# Patient Record
Sex: FEMALE | Race: OTHER | NOT HISPANIC OR LATINO | Employment: FULL TIME | URBAN - METROPOLITAN AREA
[De-identification: names, ages, dates, MRNs, and addresses within clinical notes are randomized per-mention and may not be internally consistent; named-entity substitution may affect disease eponyms.]

---

## 2017-06-08 ENCOUNTER — HOSPITAL ENCOUNTER (OUTPATIENT)
Dept: LAB | Facility: MEDICAL CENTER | Age: 22
End: 2017-06-08
Attending: OBSTETRICS & GYNECOLOGY
Payer: COMMERCIAL

## 2017-06-26 LAB — UNCODED TEST RESULT 95040: NORMAL

## 2017-08-04 ENCOUNTER — HOSPITAL ENCOUNTER (OUTPATIENT)
Dept: LAB | Facility: MEDICAL CENTER | Age: 22
End: 2017-08-04
Attending: OBSTETRICS & GYNECOLOGY
Payer: COMMERCIAL

## 2017-08-20 ENCOUNTER — HOSPITAL ENCOUNTER (OUTPATIENT)
Facility: MEDICAL CENTER | Age: 22
End: 2017-08-20
Attending: OBSTETRICS & GYNECOLOGY | Admitting: OBSTETRICS & GYNECOLOGY
Payer: COMMERCIAL

## 2017-08-20 VITALS
WEIGHT: 154 LBS | HEIGHT: 63 IN | SYSTOLIC BLOOD PRESSURE: 120 MMHG | TEMPERATURE: 97.6 F | BODY MASS INDEX: 27.29 KG/M2 | HEART RATE: 85 BPM | DIASTOLIC BLOOD PRESSURE: 69 MMHG

## 2017-08-20 PROCEDURE — 59025 FETAL NON-STRESS TEST: CPT | Performed by: OBSTETRICS & GYNECOLOGY

## 2017-08-20 NOTE — PROGRESS NOTES
20yo  EDC , 39.3 presents with c/o decreased fm and UCs. Pt states that she hadn't felt her baby move since yesterday. Pt has been having UCs all night long. Today, her UCs have been 8 min apart. Denies LOF or vag bleeding. She has had increased mucus discharge. EFM and toco placed. FHTs found right away. Pt and family relieved. VSS. SVE 3/90/-1  1405 Dr. Jhaveri updated. OKay to discharge to home.   1410  Discharge instructions given, pt states understanding. Reactive strip obtained. Pt discharged to home  in stable condition, ambulatory, with her mother.

## 2017-08-21 ENCOUNTER — HOSPITAL ENCOUNTER (INPATIENT)
Facility: MEDICAL CENTER | Age: 22
LOS: 2 days | End: 2017-08-23
Attending: OBSTETRICS & GYNECOLOGY | Admitting: OBSTETRICS & GYNECOLOGY
Payer: COMMERCIAL

## 2017-08-21 LAB
BASOPHILS # BLD AUTO: 0.4 % (ref 0–1.8)
BASOPHILS # BLD: 0.06 K/UL (ref 0–0.12)
EOSINOPHIL # BLD AUTO: 0.04 K/UL (ref 0–0.51)
EOSINOPHIL NFR BLD: 0.3 % (ref 0–6.9)
ERYTHROCYTE [DISTWIDTH] IN BLOOD BY AUTOMATED COUNT: 43.1 FL (ref 35.9–50)
HCT VFR BLD AUTO: 44.6 % (ref 37–47)
HGB BLD-MCNC: 14.5 G/DL (ref 12–16)
HOLDING TUBE BB 8507: NORMAL
IMM GRANULOCYTES # BLD AUTO: 0.04 K/UL (ref 0–0.11)
IMM GRANULOCYTES NFR BLD AUTO: 0.3 % (ref 0–0.9)
LYMPHOCYTES # BLD AUTO: 2.18 K/UL (ref 1–4.8)
LYMPHOCYTES NFR BLD: 16.3 % (ref 22–41)
MCH RBC QN AUTO: 30.6 PG (ref 27–33)
MCHC RBC AUTO-ENTMCNC: 32.5 G/DL (ref 33.6–35)
MCV RBC AUTO: 94.1 FL (ref 81.4–97.8)
MONOCYTES # BLD AUTO: 0.6 K/UL (ref 0–0.85)
MONOCYTES NFR BLD AUTO: 4.5 % (ref 0–13.4)
NEUTROPHILS # BLD AUTO: 10.49 K/UL (ref 2–7.15)
NEUTROPHILS NFR BLD: 78.2 % (ref 44–72)
NRBC # BLD AUTO: 0 K/UL
NRBC BLD AUTO-RTO: 0 /100 WBC
PLATELET # BLD AUTO: 152 K/UL (ref 164–446)
PMV BLD AUTO: 12.6 FL (ref 9–12.9)
RBC # BLD AUTO: 4.74 M/UL (ref 4.2–5.4)
WBC # BLD AUTO: 13.4 K/UL (ref 4.8–10.8)

## 2017-08-21 PROCEDURE — 700101 HCHG RX REV CODE 250

## 2017-08-21 PROCEDURE — 700111 HCHG RX REV CODE 636 W/ 250 OVERRIDE (IP): Performed by: OBSTETRICS & GYNECOLOGY

## 2017-08-21 PROCEDURE — 10907ZC DRAINAGE OF AMNIOTIC FLUID, THERAPEUTIC FROM PRODUCTS OF CONCEPTION, VIA NATURAL OR ARTIFICIAL OPENING: ICD-10-PCS | Performed by: OBSTETRICS & GYNECOLOGY

## 2017-08-21 PROCEDURE — 700111 HCHG RX REV CODE 636 W/ 250 OVERRIDE (IP)

## 2017-08-21 PROCEDURE — 59025 FETAL NON-STRESS TEST: CPT | Performed by: OBSTETRICS & GYNECOLOGY

## 2017-08-21 PROCEDURE — 36415 COLL VENOUS BLD VENIPUNCTURE: CPT

## 2017-08-21 PROCEDURE — 303615 HCHG EPIDURAL/SPINAL ANESTHESIA FOR LABOR

## 2017-08-21 PROCEDURE — 770002 HCHG ROOM/CARE - OB PRIVATE (112)

## 2017-08-21 PROCEDURE — 304965 HCHG RECOVERY SERVICES

## 2017-08-21 PROCEDURE — 4A1HX4Z MONITORING OF PRODUCTS OF CONCEPTION, CARDIAC ELECTRICAL ACTIVITY, EXTERNAL APPROACH: ICD-10-PCS | Performed by: OBSTETRICS & GYNECOLOGY

## 2017-08-21 PROCEDURE — 700105 HCHG RX REV CODE 258: Performed by: OBSTETRICS & GYNECOLOGY

## 2017-08-21 PROCEDURE — 85025 COMPLETE CBC W/AUTO DIFF WBC: CPT

## 2017-08-21 PROCEDURE — 59409 OBSTETRICAL CARE: CPT

## 2017-08-21 PROCEDURE — 3E0234Z INTRODUCTION OF SERUM, TOXOID AND VACCINE INTO MUSCLE, PERCUTANEOUS APPROACH: ICD-10-PCS | Performed by: OBSTETRICS & GYNECOLOGY

## 2017-08-21 PROCEDURE — 0UQMXZZ REPAIR VULVA, EXTERNAL APPROACH: ICD-10-PCS | Performed by: OBSTETRICS & GYNECOLOGY

## 2017-08-21 RX ORDER — OXYTOCIN 10 [USP'U]/ML
10 INJECTION, SOLUTION INTRAMUSCULAR; INTRAVENOUS
Status: DISCONTINUED | OUTPATIENT
Start: 2017-08-21 | End: 2017-08-22 | Stop reason: HOSPADM

## 2017-08-21 RX ORDER — PENICILLIN G POTASSIUM 5000000 [IU]/1
5 INJECTION, POWDER, FOR SOLUTION INTRAMUSCULAR; INTRAVENOUS ONCE
Status: COMPLETED | OUTPATIENT
Start: 2017-08-21 | End: 2017-08-21

## 2017-08-21 RX ORDER — ROPIVACAINE HYDROCHLORIDE 2 MG/ML
INJECTION, SOLUTION EPIDURAL; INFILTRATION; PERINEURAL
Status: COMPLETED
Start: 2017-08-21 | End: 2017-08-21

## 2017-08-21 RX ORDER — SODIUM CHLORIDE, SODIUM LACTATE, POTASSIUM CHLORIDE, CALCIUM CHLORIDE 600; 310; 30; 20 MG/100ML; MG/100ML; MG/100ML; MG/100ML
INJECTION, SOLUTION INTRAVENOUS CONTINUOUS
Status: DISPENSED | OUTPATIENT
Start: 2017-08-21 | End: 2017-08-21

## 2017-08-21 RX ORDER — DEXTROSE, SODIUM CHLORIDE, SODIUM LACTATE, POTASSIUM CHLORIDE, AND CALCIUM CHLORIDE 5; .6; .31; .03; .02 G/100ML; G/100ML; G/100ML; G/100ML; G/100ML
INJECTION, SOLUTION INTRAVENOUS CONTINUOUS
Status: DISCONTINUED | OUTPATIENT
Start: 2017-08-21 | End: 2017-08-22 | Stop reason: HOSPADM

## 2017-08-21 RX ORDER — MISOPROSTOL 200 UG/1
800 TABLET ORAL
Status: DISCONTINUED | OUTPATIENT
Start: 2017-08-21 | End: 2017-08-22 | Stop reason: HOSPADM

## 2017-08-21 RX ORDER — ONDANSETRON 4 MG/1
4 TABLET, ORALLY DISINTEGRATING ORAL EVERY 6 HOURS PRN
Status: DISCONTINUED | OUTPATIENT
Start: 2017-08-21 | End: 2017-08-23 | Stop reason: HOSPADM

## 2017-08-21 RX ORDER — ALUMINA, MAGNESIA, AND SIMETHICONE 2400; 2400; 240 MG/30ML; MG/30ML; MG/30ML
30 SUSPENSION ORAL EVERY 6 HOURS PRN
Status: DISCONTINUED | OUTPATIENT
Start: 2017-08-21 | End: 2017-08-22 | Stop reason: HOSPADM

## 2017-08-21 RX ORDER — ONDANSETRON 2 MG/ML
4 INJECTION INTRAMUSCULAR; INTRAVENOUS EVERY 6 HOURS PRN
Status: DISCONTINUED | OUTPATIENT
Start: 2017-08-21 | End: 2017-08-23 | Stop reason: HOSPADM

## 2017-08-21 RX ORDER — METHYLERGONOVINE MALEATE 0.2 MG/ML
0.2 INJECTION INTRAVENOUS
Status: DISCONTINUED | OUTPATIENT
Start: 2017-08-21 | End: 2017-08-22 | Stop reason: HOSPADM

## 2017-08-21 RX ORDER — CARBOPROST TROMETHAMINE 250 UG/ML
250 INJECTION, SOLUTION INTRAMUSCULAR
Status: DISCONTINUED | OUTPATIENT
Start: 2017-08-21 | End: 2017-08-22 | Stop reason: HOSPADM

## 2017-08-21 RX ADMIN — PENICILLIN G POTASSIUM 5 MILLION UNITS: 5000000 POWDER, FOR SOLUTION INTRAMUSCULAR; INTRAPLEURAL; INTRATHECAL; INTRAVENOUS at 13:44

## 2017-08-21 RX ADMIN — SODIUM CHLORIDE, POTASSIUM CHLORIDE, SODIUM LACTATE AND CALCIUM CHLORIDE: 600; 310; 30; 20 INJECTION, SOLUTION INTRAVENOUS at 18:25

## 2017-08-21 RX ADMIN — SODIUM CHLORIDE, POTASSIUM CHLORIDE, SODIUM LACTATE AND CALCIUM CHLORIDE: 600; 310; 30; 20 INJECTION, SOLUTION INTRAVENOUS at 14:37

## 2017-08-21 RX ADMIN — SODIUM CHLORIDE, POTASSIUM CHLORIDE, SODIUM LACTATE AND CALCIUM CHLORIDE: 600; 310; 30; 20 INJECTION, SOLUTION INTRAVENOUS at 19:59

## 2017-08-21 RX ADMIN — OXYTOCIN 125 ML/HR: 10 INJECTION, SOLUTION INTRAMUSCULAR; INTRAVENOUS at 23:58

## 2017-08-21 RX ADMIN — Medication 20 UNITS: at 21:56

## 2017-08-21 RX ADMIN — ROPIVACAINE HYDROCHLORIDE 100 ML: 2 INJECTION, SOLUTION EPIDURAL; INFILTRATION at 14:38

## 2017-08-21 RX ADMIN — SODIUM CHLORIDE, POTASSIUM CHLORIDE, SODIUM LACTATE AND CALCIUM CHLORIDE: 600; 310; 30; 20 INJECTION, SOLUTION INTRAVENOUS at 13:46

## 2017-08-21 RX ADMIN — FENTANYL CITRATE 100 MCG: 50 INJECTION, SOLUTION INTRAMUSCULAR; INTRAVENOUS at 13:59

## 2017-08-21 RX ADMIN — SODIUM CHLORIDE 2.5 MILLION UNITS: 9 INJECTION, SOLUTION INTRAVENOUS at 17:37

## 2017-08-21 ASSESSMENT — LIFESTYLE VARIABLES
ALCOHOL_USE: NO
DO YOU DRINK ALCOHOL: NO
EVER_SMOKED: NEVER

## 2017-08-21 ASSESSMENT — PATIENT HEALTH QUESTIONNAIRE - PHQ9
SUM OF ALL RESPONSES TO PHQ9 QUESTIONS 1 AND 2: 0
2. FEELING DOWN, DEPRESSED, IRRITABLE, OR HOPELESS: NOT AT ALL
SUM OF ALL RESPONSES TO PHQ QUESTIONS 1-9: 0
1. LITTLE INTEREST OR PLEASURE IN DOING THINGS: NOT AT ALL

## 2017-08-21 ASSESSMENT — COPD QUESTIONNAIRES
HAVE YOU SMOKED AT LEAST 100 CIGARETTES IN YOUR ENTIRE LIFE: NO/DON'T KNOW
COPD SCREENING SCORE: 0
DO YOU EVER COUGH UP ANY MUCUS OR PHLEGM?: NO/ONLY WITH OCCASIONAL COLDS OR INFECTIONS
DURING THE PAST 4 WEEKS HOW MUCH DID YOU FEEL SHORT OF BREATH: NONE/LITTLE OF THE TIME

## 2017-08-21 ASSESSMENT — PAIN SCALES - GENERAL
PAINLEVEL_OUTOF10: 0

## 2017-08-21 NOTE — IP AVS SNAPSHOT
Home Care Instructions                                                                                                                Ericka Tejeda   MRN: [id number]    Department:  POST PARTUM 31   2017           Follow-up Information     1. Follow up with Izabella Coley M.D. In 6 weeks.    Specialty:  OB/Gyn    Contact information    [id number]  [place] [address]  [phone]         I assume responsibility for securing a follow-up  Screening blood test on my baby within the specified date range.    -                  Discharge Instructions       POSTPARTUM DISCHARGE INSTRUCTIONS FOR MOM    YOB: 1995   Age: 21 y.o.               Admit Date: 2017     Discharge Date: 2017  Attending Doctor:  Izabella Coley M.D.                  Allergies:  Review of patient's allergies indicates no known allergies.    Discharged to home by car. Discharged via wheelchair, hospital escort: Yes.  Special equipment needed: Not Applicable  Belongings with: Personal  Be sure to schedule a follow-up appointment with your primary care doctor or any specialists as instructed.     Discharge Plan:   Diet Plan: Discussed  Activity Level: Discussed  Confirmed Follow up Appointment: Patient to Call and Schedule Appointment  Confirmed Symptoms Management: Discussed  Medication Reconciliation Updated: No (Comments)  Influenza Vaccine Indication: Not indicated: Previously immunized this influenza season and > 8 years of age    REASONS TO CALL YOUR OBSTETRICIAN:  1.   Persistent fever or shaking chills (Temperature higher than 100.4)  2.   Heavy bleeding (soaking more than 1 pad per hour); Passing clots  3.   Foul odor from vagina  4.   Mastitis (Breast infection; breast pain, chills, fever, redness)  5.   Urinary pain, burning or frequency  6.   Episiotomy infection  7.   Abdominal incision infection  8.   Severe depression longer than 24 hours    HAND WASHING  · Prior to handling the  "baby.  · Before breastfeeding or bottle feeding baby.  · After using the bathroom or changing the baby's diaper.    WOUND CARE  Ask your physician for additional care instructions.  In general:    ·  Incision:      · Keep clean and dry.    · Do NOT lift anything heavier than your baby for up to 6 weeks.    · There should not be any opening or pus.      VAGINAL CARE  · Nothing inside vagina for 6 weeks: no sexual intercourse, tampons or douching.  · Bleeding may continue for 2-4 weeks.  Amount may vary.    · Call your physician for heavy bleeding which means soaking more than 1 pad per hour    BIRTH CONTROL  · It is possible to become pregnant at any time after delivery and while breastfeeding.  · Plan to discuss a method of birth control with your physician at your follow up visit. visit.    DIET AND ELIMINATION  · Eating more fiber (bran cereal, fruits, and vegetables) and drinking plenty of fluids will help to avoid constipation.  · Urinary frequency after childbirth is normal.    POSTPARTUM BLUES  During the first few days after birth, you may experience a sense of the \"blues\" which may include impatience, irritability or even crying.  These feeling come and go quickly.  However, as many as 1 in 10 women experience emotional symptoms known as postpartum depression.    Postpartum depression:  May start as early as the second or third day after delivery or take several weeks or months to develop.  Symptoms of \"blues\" are present, but are more intense:  Crying spells; loss of appetite; feelings of hopelessness or loss of control; fear of touching the baby; over concern or no concern at all about the baby; little or no concern about your own appearance/caring for yourself; and/or inability to sleep or excessive sleeping.  Contact your physician if you are experiencing any of these symptoms.    Crisis Hotline:  · National Crisis Hotline:  0-016-BHMWPOX  Or 1-149.953.3810  · [place] Crisis Hotline:  " "1-922.795.2598  Or 399-600-2193    PREVENTING SHAKEN BABY:  If you are angry or stressed, PUT THE BABY IN THE CRIB, step away, take some deep breaths, and wait until you are calm to care for the baby.  DO NOT SHAKE THE BABY.  You are not alone, call a supporter for help.    · Crisis Call Center 24/7 crisis line 446-541-8835 or 1-600.635.2512  · You can also text them, text \"ANSWER\" to 275561    QUIT SMOKING/TOBACCO USE:  I understand the use of any tobacco products increases my chance of suffering from future heart disease and could cause other illnesses which may shorten my life. Quitting the use of tobacco products is the single most important thing I can do to improve my health. For further information on smoking / tobacco cessation call a Toll Free Quit Line at 1-746.742.6579 (*National Cancer [place]) or 1-554.248.8943 (American Lung Association) or you can access the web based program at www.lungusa.org.    · [place] Tobacco Users Help Line:  (684) 148-4239       Toll Free: 1-786.566.1246  · Quit Tobacco Program [facility] (328)403-1245    DEPRESSION / SUICIDE RISK:  As you are discharged from this [facility], it is important to learn how to keep safe from harming yourself.    Recognize the warning signs:  · Abrupt changes in personality, positive or negative- including increase in energy   · Giving away possessions  · Change in eating patterns- significant weight changes-  positive or negative  · Change in sleeping patterns- unable to sleep or sleeping all the time   · Unwillingness or inability to communicate  · Depression  · Unusual sadness, discouragement and loneliness  · Talk of wanting to die  · Neglect of personal appearance   · Rebelliousness- reckless behavior  · Withdrawal from people/activities they love  · Confusion- inability to concentrate     If you or a loved one observes any of these behaviors or has concerns about self-harm, here's what you can do:  · Talk " about it- your feelings and reasons for harming yourself  · Remove any means that you might use to hurt yourself (examples: pills, rope, extension cords, firearm)  · Get professional help from the community (Mental Health, Substance Abuse, psychological counseling)  · Do not be alone:Call your Safe Contact- someone whom you trust who will be there for you.  · Call your local CRISIS HOTLINE 118-2134 or 142-908-6767  · Call your local Children's Mobile Crisis Response Team [place] (091) 339-6465 or wwwRxApps  · Call the toll free National Suicide Prevention Hotlines   · National Suicide Prevention Lifeline 764-446-TJTN (7904)  · National Hope Line Network 800-SUICIDE (576-8893)    DISCHARGE SURVEY:  Thank you for choosing [facility].  We hope we provided you with very good care.  You may be receiving a survey in the mail.  Please fill it out.  Your opinion is valuable to us.    ADDITIONAL EDUCATIONAL MATERIALS GIVEN TO PATIENT:        My signature on this form indicates that:  1.  I have reviewed and understand the above information  2.  My questions regarding this information have been answered to my satisfaction.  3.  I have formulated a plan with my discharge nurse to obtain my prescribed medication for home.         Discharge Medication Instructions:    Below are the medications your physician expects you to take upon discharge:    Review all your home medications and newly ordered medications with your doctor and/or pharmacist. Follow medication instructions as directed by your doctor and/or pharmacist.    Please keep your medication list with you and share with your physician.               Medication List      START taking these medications        Instructions    Morning Afternoon Evening Bedtime    ibuprofen 600 MG Tabs   Last time this was given:  600 mg on 8/22/2017 12:45 AM   Commonly known as:  MOTRIN        Take 1 Tab by mouth every 6 hours as needed.   Dose:  600 mg                         oxycodone-acetaminophen 5-325 MG Tabs   Last time this was given:  1 Tab on 8/22/2017 12:45 AM   Commonly known as:  PERCOCET        Take 1-2 Tabs by mouth every four hours as needed.   Dose:  1-2 Tab                          CONTINUE taking these medications        Instructions    Morning Afternoon Evening Bedtime    PRENATA PO        Take  by mouth.                             Where to Get Your Medications      Information about where to get these medications is not yet available     ! Ask your nurse or doctor about these medications    - ibuprofen 600 MG Tabs  - oxycodone-acetaminophen 5-325 MG Tabs            Crisis Hotline:     [place] Crisis Hotline:  8-625-PIXZTZT or 1-767.313.3395    [place] Crisis Hotline:    1-972.614.4780 or 343-810-4913        Disclaimer           _____________________________________                     __________       ________       Patient/Mother Signature or Legal                          Date                   Time

## 2017-08-21 NOTE — H&P
HISTORY OF PRESENT ILLNESS:  This is a 21-year-old  1, para 0 at 39   weeks and 4 days who presents to labor and delivery with painful contractions   that started this morning.  She denies loss of fluid, vaginal bleeding, and   reports good fetal movement.  She desires an epidural for pain management.    Prenatal care has been with Dr. Coley.  Her ECCIL is 2017.  She is blood   type AB positive, AST negative, rubella immune, hep B surface antigen   negative, HIV negative, gonorrhea and chlamydia negative, RPR nonreactive, GBS   positive.  Genetic screening was declined.  Ultrasound showed normal fetal   anatomical survey.  Cystic fibrosis was negative.  Chlamydia was positive and   treated with a negative test of cure on 2017.  Additionally, the father   of the baby traveled to [place].  They have had negative testing for Zika,   dengue and chikungunya virus during this pregnancy.  Most recent testing was   completed less than 1 week ago.    PAST SURGICAL HISTORY:  None.    MEDICATIONS:  Prenatal vitamins.    ALLERGIES:  NKDA.    GYNECOLOGIC HISTORY:  Patient has a history of chlamydia, treated this   pregnancy with a negative test of cure.  She has no history of herpes.    OBSTETRICAL HISTORY:   1 is current pregnancy.    SOCIAL HISTORY:  The patient denies tobacco, alcohol or drugs.  Father of the   baby is named Terrence.  He has been involved in the pregnancy.    PHYSICAL EXAMINATION:  VITAL SIGNS:  Afebrile.  Vital signs stable.  GENERAL:  She is a well-developed, well-nourished female, in no acute   distress.  ABDOMEN:  Gravid.  Estimated fetal weight is 7.5-8 pounds.  PELVIC:  Sterile vaginal exam per nursing is 490 and -2 with a bulging bag of   water.  Tocometer shows contractions every 3-5 minutes.  Fetal heart tones   baseline 130s with moderate long-term variability.  Accels present.  Tracing   is limited by maternal movement.    LABORATORY DATA:  Currently pending.    ASSESSMENT  AND PLAN:  This is a 21-year-old  1, para 0 at 39 weeks and   4 days in labor.  1.  Labor, the patient is progressing spontaneously on her own.  Anticipate   normal spontaneous vaginal delivery.  2.  Fetal surveillance consistent with category 2.  3.  Pain management.  The patient desires an epidural.  4.  Group B Strep positive.  We will plan to treat with penicillin.       ____________________________________     MD MARYLOU THOMPSON / SASKIA    DD:  2017 13:10:54  DT:  2017 14:10:22    D#:  [id number]  Job#:  [id number]

## 2017-08-21 NOTE — PROGRESS NOTES
EDC   39 4    Pt prsents to L&D with c/o contractions. Pt states that she has been abram all day, and these contractions have gotten stronger over the past few hours. Pt reports good fetal movement, denies vaginal bleeding and leaking of fluid. SVE /-1.  Working here, report given. MD in room to see pt. Order received to admit pt for labor.    1325- report to Gera WHITLEY

## 2017-08-21 NOTE — CARE PLAN
Problem: Pain  Goal: Alleviation of Pain or a reduction in pain to the patient’s comfort goal  Outcome: PROGRESSING AS EXPECTED  Pt reports good pain relief from epidural    Problem: Risk for Fluid Imbalance  Goal: Promotion of Fluid Balance  Outcome: PROGRESSING AS EXPECTED  LR running; educated to continue drinking water

## 2017-08-21 NOTE — PROGRESS NOTES
1330: Report received from Diandra WHITLEY. Pt ambulated to labor room. Admission procedures completed  1435: Dr. Shah at bedside to place epidural; pt tolerated well

## 2017-08-21 NOTE — IP AVS SNAPSHOT
8/23/2017    Ericka Tejeda  [address]  [place] [address]    Dear Ericka:    [facility] wants to ensure your discharge home is safe and you or your loved ones have had all of your questions answered regarding your care after you leave the hospital.    Below is a list of resources and contact information should you have any questions regarding your hospital stay, follow-up instructions, or active medical symptoms.    Questions or Concerns Regarding… Contact   Medical Questions Related to Your Discharge  (7 days a week, 8am-5pm) Contact a Nurse Care Coordinator   [phone]   Medical Questions Not Related to Your Discharge  (24 hours a day / 7 days a week)  Contact the Nurse Health Line   [phone]    Medications or Discharge Instructions Refer to your discharge packet   or contact your [facility] Primary Care Provider   [phone]   Follow-up Appointment(s) Schedule your appointment via [employer]   or contact Scheduling [phone]   Billing Review your statement via [employer]  or contact Billing [phone]   Medical Records Review your records via [employer]   or contact Medical Records [phone]     You may receive a telephone call within two days of discharge. This call is to make certain you understand your discharge instructions and have the opportunity to have any questions answered. You can also easily access your medical information, test results and upcoming appointments via the [employer] free online health management tool. You can learn more and sign up at [employer]. For assistance setting up your [employer] account, please call [phone].    Once again, we want to ensure your discharge home is safe and that you have a clear understanding of any next steps in your care. If you have any questions or concerns, please do not hesitate to contact us, we are here for you. Thank you for choosing [facility] for your healthcare needs.    Sincerely,    Your [facility]  Team

## 2017-08-22 LAB
ERYTHROCYTE [DISTWIDTH] IN BLOOD BY AUTOMATED COUNT: 40.4 FL (ref 35.9–50)
HCT VFR BLD AUTO: 34.9 % (ref 37–47)
HGB BLD-MCNC: 11.8 G/DL (ref 12–16)
MCH RBC QN AUTO: 30.7 PG (ref 27–33)
MCHC RBC AUTO-ENTMCNC: 33.8 G/DL (ref 33.6–35)
MCV RBC AUTO: 90.9 FL (ref 81.4–97.8)
PLATELET # BLD AUTO: 125 K/UL (ref 164–446)
PMV BLD AUTO: 12.2 FL (ref 9–12.9)
RBC # BLD AUTO: 3.84 M/UL (ref 4.2–5.4)
UNCODED TEST RESULT 95040: NORMAL
WBC # BLD AUTO: 12.7 K/UL (ref 4.8–10.8)

## 2017-08-22 PROCEDURE — 90715 TDAP VACCINE 7 YRS/> IM: CPT

## 2017-08-22 PROCEDURE — 700102 HCHG RX REV CODE 250 W/ 637 OVERRIDE(OP): Performed by: OBSTETRICS & GYNECOLOGY

## 2017-08-22 PROCEDURE — 90471 IMMUNIZATION ADMIN: CPT

## 2017-08-22 PROCEDURE — 770002 HCHG ROOM/CARE - OB PRIVATE (112)

## 2017-08-22 PROCEDURE — 36415 COLL VENOUS BLD VENIPUNCTURE: CPT

## 2017-08-22 PROCEDURE — A9270 NON-COVERED ITEM OR SERVICE: HCPCS | Performed by: OBSTETRICS & GYNECOLOGY

## 2017-08-22 PROCEDURE — 700112 HCHG RX REV CODE 229

## 2017-08-22 PROCEDURE — 85027 COMPLETE CBC AUTOMATED: CPT

## 2017-08-22 RX ORDER — OXYCODONE HYDROCHLORIDE AND ACETAMINOPHEN 5; 325 MG/1; MG/1
1 TABLET ORAL EVERY 4 HOURS PRN
Status: DISCONTINUED | OUTPATIENT
Start: 2017-08-22 | End: 2017-08-23 | Stop reason: HOSPADM

## 2017-08-22 RX ORDER — ACETAMINOPHEN 325 MG/1
325 TABLET ORAL EVERY 4 HOURS PRN
Status: DISCONTINUED | OUTPATIENT
Start: 2017-08-22 | End: 2017-08-23 | Stop reason: HOSPADM

## 2017-08-22 RX ORDER — BISACODYL 10 MG
10 SUPPOSITORY, RECTAL RECTAL PRN
Status: DISCONTINUED | OUTPATIENT
Start: 2017-08-22 | End: 2017-08-23 | Stop reason: HOSPADM

## 2017-08-22 RX ORDER — MAG HYDROX/ALUMINUM HYD/SIMETH 400-400-40
1 SUSPENSION, ORAL (FINAL DOSE FORM) ORAL
Status: DISCONTINUED | OUTPATIENT
Start: 2017-08-22 | End: 2017-08-23 | Stop reason: HOSPADM

## 2017-08-22 RX ORDER — MISOPROSTOL 200 UG/1
600 TABLET ORAL
Status: DISCONTINUED | OUTPATIENT
Start: 2017-08-22 | End: 2017-08-23 | Stop reason: HOSPADM

## 2017-08-22 RX ORDER — DOCUSATE SODIUM 100 MG/1
100 CAPSULE, LIQUID FILLED ORAL 2 TIMES DAILY PRN
Status: DISCONTINUED | OUTPATIENT
Start: 2017-08-22 | End: 2017-08-23 | Stop reason: HOSPADM

## 2017-08-22 RX ORDER — CARBOPROST TROMETHAMINE 250 UG/ML
250 INJECTION, SOLUTION INTRAMUSCULAR
Status: DISCONTINUED | OUTPATIENT
Start: 2017-08-22 | End: 2017-08-23 | Stop reason: HOSPADM

## 2017-08-22 RX ORDER — SODIUM CHLORIDE, SODIUM LACTATE, POTASSIUM CHLORIDE, CALCIUM CHLORIDE 600; 310; 30; 20 MG/100ML; MG/100ML; MG/100ML; MG/100ML
INJECTION, SOLUTION INTRAVENOUS PRN
Status: DISCONTINUED | OUTPATIENT
Start: 2017-08-22 | End: 2017-08-23 | Stop reason: HOSPADM

## 2017-08-22 RX ORDER — METHYLERGONOVINE MALEATE 0.2 MG/ML
0.2 INJECTION INTRAVENOUS
Status: DISCONTINUED | OUTPATIENT
Start: 2017-08-22 | End: 2017-08-23 | Stop reason: HOSPADM

## 2017-08-22 RX ORDER — OXYCODONE AND ACETAMINOPHEN 10; 325 MG/1; MG/1
1 TABLET ORAL EVERY 4 HOURS PRN
Status: DISCONTINUED | OUTPATIENT
Start: 2017-08-22 | End: 2017-08-23 | Stop reason: HOSPADM

## 2017-08-22 RX ORDER — VITAMIN A ACETATE, BETA CAROTENE, ASCORBIC ACID, CHOLECALCIFEROL, .ALPHA.-TOCOPHEROL ACETATE, DL-, THIAMINE MONONITRATE, RIBOFLAVIN, NIACINAMIDE, PYRIDOXINE HYDROCHLORIDE, FOLIC ACID, CYANOCOBALAMIN, CALCIUM CARBONATE, FERROUS FUMARATE, ZINC OXIDE, CUPRIC OXIDE 3080; 12; 120; 400; 1; 1.84; 3; 20; 22; 920; 25; 200; 27; 10; 2 [IU]/1; UG/1; MG/1; [IU]/1; MG/1; MG/1; MG/1; MG/1; MG/1; [IU]/1; MG/1; MG/1; MG/1; MG/1; MG/1
1 TABLET, FILM COATED ORAL EVERY MORNING
Status: DISCONTINUED | OUTPATIENT
Start: 2017-08-22 | End: 2017-08-23 | Stop reason: HOSPADM

## 2017-08-22 RX ORDER — IBUPROFEN 600 MG/1
600 TABLET ORAL EVERY 6 HOURS PRN
Status: DISCONTINUED | OUTPATIENT
Start: 2017-08-22 | End: 2017-08-23 | Stop reason: HOSPADM

## 2017-08-22 RX ADMIN — TETANUS TOXOID, REDUCED DIPHTHERIA TOXOID AND ACELLULAR PERTUSSIS VACCINE, ADSORBED 0.5 ML: 5; 2.5; 8; 8; 2.5 SUSPENSION INTRAMUSCULAR at 03:12

## 2017-08-22 RX ADMIN — Medication 1 TABLET: at 08:38

## 2017-08-22 RX ADMIN — IBUPROFEN 600 MG: 600 TABLET, FILM COATED ORAL at 00:45

## 2017-08-22 RX ADMIN — OXYCODONE HYDROCHLORIDE AND ACETAMINOPHEN 1 TABLET: 5; 325 TABLET ORAL at 00:45

## 2017-08-22 ASSESSMENT — PAIN SCALES - GENERAL
PAINLEVEL_OUTOF10: 2
PAINLEVEL_OUTOF10: 0
PAINLEVEL_OUTOF10: 2
PAINLEVEL_OUTOF10: 7
PAINLEVEL_OUTOF10: 0

## 2017-08-22 NOTE — CARE PLAN
Problem: Communication  Goal: The ability to communicate needs accurately and effectively will improve  Outcome: PROGRESSING AS EXPECTED  Reviewed plan of care with patient who verbalized understanding.    Problem: Altered physiologic condition related to immediate post-delivery state and potential for bleeding/hemorrhage  Goal: Patient physiologically stable as evidenced by normal lochia, palpable uterine involution and vital signs within normal limits  Outcome: PROGRESSING AS EXPECTED  Fundus firm.  Lochia scant.  VSS.

## 2017-08-22 NOTE — PROGRESS NOTES
"PPD#1    S: Feeling well. Pain controlled. Lochia normal    O: Blood pressure 100/51, pulse 68, temperature 37.1 °C (98.8 °F), temperature source Temporal, resp. rate 16, height 1.6 m (5' 3\"), weight 69.854 kg (154 lb), SpO2 99 %.  Gen: NAD  Fundus firm. No TTP  Ext: No c/c/e    Labs: GBS positive, Hct 44    A/P 22yo  s/p   1. Routine pp care  2. Home tomorrow   "

## 2017-08-22 NOTE — PROGRESS NOTES
Up to BR with assistance, ambulating well with steady gait. Voided without difficulty. Back to bed and started breastfeeding. Will continue to monitor.

## 2017-08-22 NOTE — PROGRESS NOTES
0703- Bedside report received from GUTIERREZ Michael.  Patient denied needs.  Patient assessment done.  Patient stated that she is voiding without difficulty and passing flatus.  Patient denied dizziness and stated that she is walking without difficulty.  Discussed pain management plan and patient prefers to call for pain intervention as needed.  Reviewed plan of care.  FOB at bedside.  Patient encouraged to watch the [employer] education videos.

## 2017-08-22 NOTE — CARE PLAN
Problem: Safety  Goal: Free from accidental injury  Outcome: MET Date Met:  08/22/17  Pt and infant free from accidental injury while on unit.    Problem: Pain  Goal: Alleviation of Pain or a reduction in pain to the patient’s comfort goal  Outcome: DISCHARGED-GOAL NOT MET Date Met:  08/22/17  Pt declines intervention for pain.    Problem: Risk for Infection, Impaired Wound Healing  Goal: Remain free from signs and symptoms of infection  Outcome: MET Date Met:  08/22/17  Pt free from signs and symptoms of infection.

## 2017-08-22 NOTE — PROGRESS NOTES
2215- Report received from DAWSON Monteiro RN. Pt resting comfortably in bed holding infant. FOB at bedside. POC discussed.    0025- Pt up to BR with standby assist. Voided successfully.    0035- Pt and infant transferred to PPU via wheelchair without incident, accompanied by FOB. Report given to MYRON Young RN. Bands and cuddles verified. Infant taken to NBN to be placed under warmer. (0010 rectal temp 97.4F)

## 2017-08-22 NOTE — PROGRESS NOTES
: Dr. Coley in house; had her review tracing including decels. AROM at 175 with clr, slightly green tinged; pad changed; will continue to assess  : Probable LD at  with following PD; 10 L O2 reapplied; SVE C/+2 and responds to fetal scalp stimulation. Dr. Coley notified  : Began pushing; pt pushes well with coaching  : PD to 70s for 2min 20sec after push; stopped pushing and repositioned on left side; O2 remains on and LR bolus remains running. Dr. Coley notified; at bedside to assess; pushed with pt at  with subsequent decel to 50s; assisted to hands and knees position to allow fetal recovery. Pt consented for vacuum assist when fetus descends more and possible c/s if unsuccessful or if nonreassuring FHTs continue  : Began pushing in hands and knees position and alternating pushing/resting with UCs; pushing adequately  : Dr. Coley called into room for  of viable female at . RT called for delivery d/t recurrent decels and possible mec; thick mec behind baby. Mother and baby stable   : Report given to Dona WHITLEY

## 2017-08-22 NOTE — CONSULTS
Mom states breastfeeding is sustained on both sides each feeding with no pain. Has no questions. Written information at bedside. Knows to call for latch assist if needed. Bottles are not recommended given positive breastfeeding course, mom states agreement.

## 2017-08-22 NOTE — PROGRESS NOTES
" @ 2152  Baby Girl \"cJluis\"  Apgars 8/9  Weight pending  Bilateral labial tears with repair  EBL 200cc  [id number]  Izabella Working  "

## 2017-08-22 NOTE — PROGRESS NOTES
Admitted from labor and delivery, post vag delivery in satisfactory condition. came via wheelchair and placed to bed comfortably, IVF of LR with 20 Units of Pitocin @ 125 ml/hr.infusing well into RFA.. Assessment done, fundus firm at Umbilicus,  lochia rubra minimal. C/O abdominal cramping and back pain PRN medication given for score of 7/10. Will continue to monitor. Oriented to room/atif light and call light place within reach.

## 2017-08-22 NOTE — PROGRESS NOTES
"S: Comfy with epidural    O: Blood pressure 114/62, pulse 71, temperature 36.6 °C (97.9 °F), height 1.6 m (5' 3\"), weight 69.854 kg (154 lb), SpO2 98 %.  Gen: NAD  SVE: 9/100/0 AROM clear versus light mec  [place]: CTX q2-5  FHT: 150 with moderate LTV. +accels. Occ variable. Occ late    Labs: Hct 44, Plt 152, GBS positive    A/P 20yo  @ 39w4d in labor  1. Labor: progressing well. Anticipate   2. Cat II  3. Pain: controlled with epidural  4. GBS positive on PCN.   "

## 2017-08-22 NOTE — DELIVERY NOTE
DATE OF SERVICE:  2017    PREOPERATIVE DIAGNOSES:  1.  A 21-year-old  1, para 0 at 39 weeks and 4 days.  2.  Active labor.  3.  Group B strep positive.    POSTOPERATIVE DIAGNOSES:  1.  A 21-year-old  1, para 0 at 39 weeks and 4 days.  2.  Active labor.  3.  Group B strep positive.    PROCEDURES:  1.  Normal spontaneous vaginal delivery.  2.  Repair of bilateral labial lacerations.    PRIMARY AND DELIVERING OBSTETRICIAN:  Izabella Coley MD    ANESTHESIOLOGIST:  Avinash Shah MD    PEDIATRICIAN:  Dr. Shah.    HOSPITAL COURSE:  The patient is a 21-year-old  1, para 0 who presented   in active labor on 2017.  At the time of presentation, she was 4 cm   dilated.  She received an epidural and progressed to complete spontaneously.    During this process, she did have some variable decelerations which resolved   with maternal repositioning.  She pushed well to deliver a baby girl OA over   an intact perineum.  The right anterior shoulder delivered easily and meconium   was noted.  Respiratory therapy was present at the time of delivery.  The   baby was placed on maternal stomach.  The cord was noted to be markedly short.    This was clamped and cut, and baby was handed to respiratory therapy for   routine evaluation.  Pitocin was started, placenta delivered easily and intact   under gentle manual traction.  The fundus was firm and midline.  Her perineum   was inspected.  She had bilateral labial lacerations with partial avulsion of   the left labia.  Both of these were repaired in the usual fashion using 3-0   Vicryl.    FINDINGS:  1.  Baby girl, named Jason, at 2152, Apgars 8 and 9, weight currently pending.  2.  Moderate meconium.  3.  Normal placenta with 3-vessel cord.    COUNTS:  Correct.    COMPLICATIONS:  None apparent.    ESTIMATED BLOOD LOSS:  200 mL.    DISPOSITION:  Stable for routine postpartum care.       ____________________________________     IZABELLA COLEY MD    W /  SASKIA    DD:  08/21/2017 22:22:59  DT:  08/21/2017 23:21:09    D#:  [id number]  Job#:  [id number]

## 2017-08-22 NOTE — CARE PLAN
Problem: Altered physiologic condition related to immediate post-delivery state and potential for bleeding/hemorrhage  Goal: Patient physiologically stable as evidenced by normal lochia, palpable uterine involution and vital signs within normal limits  Outcome: PROGRESSING AS EXPECTED  Fundus firm @ U, lochia rubra minimal. V/S within defined limits.    Problem: Alteration in comfort related to episiotomy, vaginal repair and/or after birth pains  Goal: Patient verbalizes acceptable pain level  Outcome: PROGRESSING AS EXPECTED  Patient verbalized acceptable pain level @ this time. Will be medicated as needed.

## 2017-08-23 VITALS
HEIGHT: 63 IN | BODY MASS INDEX: 27.29 KG/M2 | HEART RATE: 110 BPM | OXYGEN SATURATION: 95 % | WEIGHT: 154 LBS | TEMPERATURE: 97.9 F | DIASTOLIC BLOOD PRESSURE: 73 MMHG | SYSTOLIC BLOOD PRESSURE: 116 MMHG | RESPIRATION RATE: 18 BRPM

## 2017-08-23 PROCEDURE — A9270 NON-COVERED ITEM OR SERVICE: HCPCS | Performed by: OBSTETRICS & GYNECOLOGY

## 2017-08-23 PROCEDURE — 700102 HCHG RX REV CODE 250 W/ 637 OVERRIDE(OP): Performed by: OBSTETRICS & GYNECOLOGY

## 2017-08-23 RX ORDER — OXYCODONE HYDROCHLORIDE AND ACETAMINOPHEN 5; 325 MG/1; MG/1
1-2 TABLET ORAL EVERY 4 HOURS PRN
Qty: 20 TAB | Refills: 0 | Status: ON HOLD | OUTPATIENT
Start: 2017-08-23 | End: 2020-05-31

## 2017-08-23 RX ORDER — IBUPROFEN 600 MG/1
600 TABLET ORAL EVERY 6 HOURS PRN
Qty: 30 TAB | Refills: 1 | Status: ON HOLD | OUTPATIENT
Start: 2017-08-23 | End: 2020-05-31

## 2017-08-23 RX ADMIN — Medication 1 TABLET: at 07:21

## 2017-08-23 ASSESSMENT — PAIN SCALES - GENERAL: PAINLEVEL_OUTOF10: 0

## 2017-08-23 ASSESSMENT — LIFESTYLE VARIABLES: EVER_SMOKED: NEVER

## 2017-08-23 NOTE — PROGRESS NOTES
Infant crying at the breast. Assisted MOB to swaddle arms of infant. MOB placed infant on breast, via football hold, and latched infant. Discussed lactation resources available to her after discharge. [facility] and states she does currently have a double electric pump from there.

## 2017-08-23 NOTE — CARE PLAN
Problem: Infection  Goal: Will remain free from infection  Patient has remained free from infection.

## 2017-08-23 NOTE — PROGRESS NOTES
"PPD#2    S: Feeling well. Pain controlled. Lochia normal. Ready to go home.     O: Blood pressure 116/73, pulse 110, temperature 36.6 °C (97.9 °F), temperature source Temporal, resp. rate 18, height 1.6 m (5' 3\"), weight 69.854 kg (154 lb), SpO2 95 %.  Gen: NAD  Fundus firm. No TTP  Ext: No c/c/e    Labs: GBS positive, RH+,. RI, Hct 44-->34    A/P 22yo  s/p   1. Routine pp care  2. Home today               "

## 2017-08-23 NOTE — CARE PLAN
Problem: Pain Management  Goal: Pain level will decrease to patient’s comfort goal  Patient verbalizes acceptable pain level

## 2017-08-23 NOTE — DISCHARGE INSTRUCTIONS
POSTPARTUM DISCHARGE INSTRUCTIONS FOR MOM    YOB: 1995   Age: 21 y.o.               Admit Date: 2017     Discharge Date: 2017  Attending Doctor:  Izabella Coley M.D.                  Allergies:  Review of patient's allergies indicates no known allergies.    Discharged to home by car. Discharged via wheelchair, hospital escort: Yes.  Special equipment needed: Not Applicable  Belongings with: Personal  Be sure to schedule a follow-up appointment with your primary care doctor or any specialists as instructed.     Discharge Plan:   Diet Plan: Discussed  Activity Level: Discussed  Confirmed Follow up Appointment: Patient to Call and Schedule Appointment  Confirmed Symptoms Management: Discussed  Medication Reconciliation Updated: No (Comments)  Influenza Vaccine Indication: Not indicated: Previously immunized this influenza season and > 8 years of age    REASONS TO CALL YOUR OBSTETRICIAN:  1.   Persistent fever or shaking chills (Temperature higher than 100.4)  2.   Heavy bleeding (soaking more than 1 pad per hour); Passing clots  3.   Foul odor from vagina  4.   Mastitis (Breast infection; breast pain, chills, fever, redness)  5.   Urinary pain, burning or frequency  6.   Episiotomy infection  7.   Abdominal incision infection  8.   Severe depression longer than 24 hours    HAND WASHING  · Prior to handling the baby.  · Before breastfeeding or bottle feeding baby.  · After using the bathroom or changing the baby's diaper.    WOUND CARE  Ask your physician for additional care instructions.  In general:    ·  Incision:      · Keep clean and dry.    · Do NOT lift anything heavier than your baby for up to 6 weeks.    · There should not be any opening or pus.      VAGINAL CARE  · Nothing inside vagina for 6 weeks: no sexual intercourse, tampons or douching.  · Bleeding may continue for 2-4 weeks.  Amount may vary.    · Call your physician for heavy bleeding which means soaking more than 1 pad  "per hour    BIRTH CONTROL  · It is possible to become pregnant at any time after delivery and while breastfeeding.  · Plan to discuss a method of birth control with your physician at your follow up visit. visit.    DIET AND ELIMINATION  · Eating more fiber (bran cereal, fruits, and vegetables) and drinking plenty of fluids will help to avoid constipation.  · Urinary frequency after childbirth is normal.    POSTPARTUM BLUES  During the first few days after birth, you may experience a sense of the \"blues\" which may include impatience, irritability or even crying.  These feeling come and go quickly.  However, as many as 1 in 10 women experience emotional symptoms known as postpartum depression.    Postpartum depression:  May start as early as the second or third day after delivery or take several weeks or months to develop.  Symptoms of \"blues\" are present, but are more intense:  Crying spells; loss of appetite; feelings of hopelessness or loss of control; fear of touching the baby; over concern or no concern at all about the baby; little or no concern about your own appearance/caring for yourself; and/or inability to sleep or excessive sleeping.  Contact your physician if you are experiencing any of these symptoms.    Crisis Hotline:  · [place] Crisis Hotline:  1-799-WFSOFNK  Or 1-625.526.3507  · [place] Crisis Hotline:  1-356.996.7138  Or 041-024-2404    PREVENTING SHAKEN BABY:  If you are angry or stressed, PUT THE BABY IN THE CRIB, step away, take some deep breaths, and wait until you are calm to care for the baby.  DO NOT SHAKE THE BABY.  You are not alone, call a supporter for help.    · Crisis [facility] Center 24/7 crisis line 307-917-9064 or 1-548.355.9641  · You can also text them, text \"ANSWER\" to 634441    QUIT SMOKING/TOBACCO USE:  I understand the use of any tobacco products increases my chance of suffering from future heart disease and could cause other illnesses which may shorten my life. Quitting the use of " tobacco products is the single most important thing I can do to improve my health. For further information on smoking / tobacco cessation call a Toll Free Quit Line at 1-750.950.3053 (*[employer] [place]) or 1-380.348.8483 (American Lung Association) or you can access the web based program at www.lungusa.org.    · [place] Tobacco Users Help Line:  (529) 786-7255       Toll Free: 1-722.914.9812  · Quit Tobacco Program [facility] (605)278-7040    DEPRESSION / SUICIDE RISK:  As you are discharged from this [facility], it is important to learn how to keep safe from harming yourself.    Recognize the warning signs:  · Abrupt changes in personality, positive or negative- including increase in energy   · Giving away possessions  · Change in eating patterns- significant weight changes-  positive or negative  · Change in sleeping patterns- unable to sleep or sleeping all the time   · Unwillingness or inability to communicate  · Depression  · Unusual sadness, discouragement and loneliness  · Talk of wanting to die  · Neglect of personal appearance   · Rebelliousness- reckless behavior  · Withdrawal from people/activities they love  · Confusion- inability to concentrate     If you or a loved one observes any of these behaviors or has concerns about self-harm, here's what you can do:  · Talk about it- your feelings and reasons for harming yourself  · Remove any means that you might use to hurt yourself (examples: pills, rope, extension cords, firearm)  · Get professional help from the community (Mental Health, Substance Abuse, psychological counseling)  · Do not be alone:Call your Safe Contact- someone whom you trust who will be there for you.  · Call your local CRISIS HOTLINE 434-6195 or 264-216-4462  · Call your local Children's Mobile Crisis Response Team [place] (690) 182-7474 or www.Eligible  · Call the toll free National Suicide Prevention Hotlines   · National  Suicide Prevention Lifeline 142-075-RYAM (5165)  · [facility] 800-SUICIDE (611-5883)    DISCHARGE SURVEY:  Thank you for choosing [facility].  We hope we provided you with very good care.  You may be receiving a survey in the mail.  Please fill it out.  Your opinion is valuable to us.    ADDITIONAL EDUCATIONAL MATERIALS GIVEN TO PATIENT:        My signature on this form indicates that:  1.  I have reviewed and understand the above information  2.  My questions regarding this information have been answered to my satisfaction.  3.  I have formulated a plan with my discharge nurse to obtain my prescribed medication for home.

## 2017-08-23 NOTE — PROGRESS NOTES
1015- Discharge education discussed with patient.  Patient verbalized understanding.  Prescriptions handed to patient.  Patient verbalized understanding of follow up appointments for herself and baby.

## 2017-08-23 NOTE — CARE PLAN
Problem: Altered physiologic condition related to immediate post-delivery state and potential for bleeding/hemorrhage  Goal: Patient physiologically stable as evidenced by normal lochia, palpable uterine involution and vital signs within normal limits  Outcome: PROGRESSING AS EXPECTED  Fundus firm, lochia light

## 2017-08-23 NOTE — DISCHARGE SUMMARY
DATE OF ADMISSION:  2017    DATE OF DISCHARGE:  2017    DIAGNOSES ON ADMISSION:  1.  A 21-year-old  1, para 0 at 39 weeks.  2.  Active labor.    PROCEDURE:  Normal spontaneous vaginal delivery.    POSTOPERATIVE DIAGNOSES:  1.  A 21-year-old  1, para 0 at 39 weeks.  2.  Active labor.    HOSPITAL COURSE:  The patient is a 21-year-old  1, para 0 who presented   at 39 weeks and 4 days in active labor.  She had an uncomplicated vaginal   delivery on 2017.  She was GBS positive and received prophylaxis in   labor.    Postpartum, the patient has done well.  She has remained afebrile with stable   vital signs.  Fundus is firm below the umbilicus.  Pain is well controlled.    Lochia is normal.  She is ambulating and voiding without difficulty.  She is   working on breastfeeding.  She desires to go home on postpartum day #2.  She   is Rh positive, rubella immune, GBS positive, pre-delivery hematocrit is 44,   postdelivery is 34.    DISCHARGE MEDICATIONS:  1.  Percocet 5/325 1-2 tablets p.o. q. 4 p.r.n. pain, dispensed 20.  2.  Ibuprofen.    DISCHARGE INSTRUCTIONS:  1.  Patient is given routine precautions for bleeding, pain, infection, VTE,   difficulty with breastfeeding and postpartum depression.  2.  Patient will follow up with me in 6 weeks for routine care.       ____________________________________     DICK THOMAS, MD HICKMAN / SASKIA    DD:  2017 08:16:13  DT:  2017 08:26:06    D#:  [id number]  Job#:  [id number]

## 2017-10-17 ENCOUNTER — HOSPITAL ENCOUNTER (OUTPATIENT)
Dept: LAB | Facility: MEDICAL CENTER | Age: 22
End: 2017-10-17
Attending: OBSTETRICS & GYNECOLOGY
Payer: COMMERCIAL

## 2017-10-17 LAB — B-HCG SERPL-ACNC: 0.9 MIU/ML (ref 0–5)

## 2017-10-17 PROCEDURE — 84702 CHORIONIC GONADOTROPIN TEST: CPT

## 2017-10-17 PROCEDURE — 36415 COLL VENOUS BLD VENIPUNCTURE: CPT

## 2020-05-29 NOTE — H&P
DATE OF ADMISSION:  2020    HISTORY OF PRESENT ILLNESS:  This is a 24-year-old G3, P1-0-1-1, whose   estimated date of delivery is  based on a 10-week ultrasound.  The   patient is currently without complaint.  She reports good fetal movement, no   contractions, vaginal bleeding or loss of fluid.    REVIEW OF SYSTEMS:  Negative for nausea, vomiting, chest pain, shortness of   breath, or fever.    PAST MEDICAL HISTORY:  None.    PAST SURGICAL HISTORY:  None.    MEDICATIONS:  None.    ALLERGIES:  No known drug allergies.    SOCIAL HISTORY:  The patient denies tobacco, ethanol, or drugs.    PREVIOUS OBSTETRICAL HISTORY:   in 2017, female infant, 6 pounds and 14   ounces, no complications.    PHYSICAL EXAMINATION:  GENERAL:  She is a well-developed, well-nourished female in no apparent   distress.  VITAL SIGNS:  Blood pressure 132/84.  HEART:  Regular rate and rhythm.  LUNGS:  Clear.  ABDOMEN:  Gravid, nontender, and soft.  EXTREMITIES:  Show no clubbing, cyanosis, or edema.  Cervical exam, 4, 50, -2.    PERTINENT LABS:  Her blood type is AB positive, antibody screen negative,   rubella immune, hepatitis B negative, nonreactive HIV, 1-hour Glucola 151,   3-hour Glucola normal, GBS negative.    ASSESSMENT AND PLAN:  A 24-year-old  3, para 1, who will be post-dates   for induction of labor.  Patient received Pitocin augmentation.  Risks and   benefits of procedure were discussed with patient.             ____________________________________     MD KAM Shelton / SASKIA    DD:  2020 13:03:36  DT:  2020 13:46:19    D#:  [id number]  Job#:  [id number]

## 2020-05-31 ENCOUNTER — ANESTHESIA EVENT (OUTPATIENT)
Dept: ANESTHESIOLOGY | Facility: MEDICAL CENTER | Age: 25
End: 2020-05-31
Payer: MEDICAID

## 2020-05-31 ENCOUNTER — ANESTHESIA (OUTPATIENT)
Dept: ANESTHESIOLOGY | Facility: MEDICAL CENTER | Age: 25
End: 2020-05-31
Payer: MEDICAID

## 2020-05-31 ENCOUNTER — HOSPITAL ENCOUNTER (INPATIENT)
Facility: MEDICAL CENTER | Age: 25
LOS: 1 days | End: 2020-06-01
Attending: OBSTETRICS & GYNECOLOGY | Admitting: OBSTETRICS & GYNECOLOGY
Payer: MEDICAID

## 2020-05-31 ENCOUNTER — APPOINTMENT (OUTPATIENT)
Dept: OBGYN | Facility: MEDICAL CENTER | Age: 25
End: 2020-05-31
Attending: OBSTETRICS & GYNECOLOGY
Payer: MEDICAID

## 2020-05-31 LAB
ABO GROUP BLD: NORMAL
BASOPHILS # BLD AUTO: 0.3 % (ref 0–1.8)
BASOPHILS # BLD: 0.03 K/UL (ref 0–0.12)
BLD GP AB SCN SERPL QL: NORMAL
C TRACH DNA GENITAL QL NAA+PROBE: NORMAL
COVID ORDER STATUS COVID19: NORMAL
EOSINOPHIL # BLD AUTO: 0.03 K/UL (ref 0–0.51)
EOSINOPHIL NFR BLD: 0.3 % (ref 0–6.9)
ERYTHROCYTE [DISTWIDTH] IN BLOOD BY AUTOMATED COUNT: 43.1 FL (ref 35.9–50)
HBV SURFACE AG SERPL QL IA: NEGATIVE
HCT VFR BLD AUTO: 39.9 % (ref 37–47)
HGB BLD-MCNC: 13.2 G/DL (ref 12–16)
HOLDING TUBE BB 8507: NORMAL
IMM GRANULOCYTES # BLD AUTO: 0.05 K/UL (ref 0–0.11)
IMM GRANULOCYTES NFR BLD AUTO: 0.6 % (ref 0–0.9)
LYMPHOCYTES # BLD AUTO: 1.72 K/UL (ref 1–4.8)
LYMPHOCYTES NFR BLD: 19.7 % (ref 22–41)
MCH RBC QN AUTO: 31.1 PG (ref 27–33)
MCHC RBC AUTO-ENTMCNC: 33.1 G/DL (ref 33.6–35)
MCV RBC AUTO: 94.1 FL (ref 81.4–97.8)
MONOCYTES # BLD AUTO: 0.4 K/UL (ref 0–0.85)
MONOCYTES NFR BLD AUTO: 4.6 % (ref 0–13.4)
NEUTROPHILS # BLD AUTO: 6.5 K/UL (ref 2–7.15)
NEUTROPHILS NFR BLD: 74.5 % (ref 44–72)
NRBC # BLD AUTO: 0 K/UL
NRBC BLD-RTO: 0 /100 WBC
PLATELET # BLD AUTO: 129 K/UL (ref 164–446)
PMV BLD AUTO: 13.3 FL (ref 9–12.9)
RBC # BLD AUTO: 4.24 M/UL (ref 4.2–5.4)
RH BLD: NORMAL
SARS-COV-2 RNA RESP QL NAA+PROBE: NOTDETECTED
SPECIMEN SOURCE: NORMAL
STREP GP B DNA PCR: NEGATIVE
TREPONEMA PALLIDUM IGG+IGM AB [PRESENCE] IN SERUM OR PLASMA BY IMMUNOASSAY: NORMAL
WBC # BLD AUTO: 8.7 K/UL (ref 4.8–10.8)

## 2020-05-31 PROCEDURE — 3E033VJ INTRODUCTION OF OTHER HORMONE INTO PERIPHERAL VEIN, PERCUTANEOUS APPROACH: ICD-10-PCS | Performed by: OBSTETRICS & GYNECOLOGY

## 2020-05-31 PROCEDURE — 700111 HCHG RX REV CODE 636 W/ 250 OVERRIDE (IP): Performed by: ANESTHESIOLOGY

## 2020-05-31 PROCEDURE — 770002 HCHG ROOM/CARE - OB PRIVATE (112)

## 2020-05-31 PROCEDURE — 700102 HCHG RX REV CODE 250 W/ 637 OVERRIDE(OP): Performed by: OBSTETRICS & GYNECOLOGY

## 2020-05-31 PROCEDURE — 10907ZC DRAINAGE OF AMNIOTIC FLUID, THERAPEUTIC FROM PRODUCTS OF CONCEPTION, VIA NATURAL OR ARTIFICIAL OPENING: ICD-10-PCS | Performed by: OBSTETRICS & GYNECOLOGY

## 2020-05-31 PROCEDURE — 36415 COLL VENOUS BLD VENIPUNCTURE: CPT

## 2020-05-31 PROCEDURE — C9803 HOPD COVID-19 SPEC COLLECT: HCPCS | Performed by: OBSTETRICS & GYNECOLOGY

## 2020-05-31 PROCEDURE — 4A1H74Z MONITORING OF PRODUCTS OF CONCEPTION, CARDIAC ELECTRICAL ACTIVITY, VIA NATURAL OR ARTIFICIAL OPENING: ICD-10-PCS | Performed by: OBSTETRICS & GYNECOLOGY

## 2020-05-31 PROCEDURE — A9270 NON-COVERED ITEM OR SERVICE: HCPCS | Performed by: OBSTETRICS & GYNECOLOGY

## 2020-05-31 PROCEDURE — U0004 COV-19 TEST NON-CDC HGH THRU: HCPCS

## 2020-05-31 PROCEDURE — 700111 HCHG RX REV CODE 636 W/ 250 OVERRIDE (IP)

## 2020-05-31 PROCEDURE — 700111 HCHG RX REV CODE 636 W/ 250 OVERRIDE (IP): Performed by: OBSTETRICS & GYNECOLOGY

## 2020-05-31 PROCEDURE — 303615 HCHG EPIDURAL/SPINAL ANESTHESIA FOR LABOR

## 2020-05-31 PROCEDURE — 59409 OBSTETRICAL CARE: CPT

## 2020-05-31 PROCEDURE — 304965 HCHG RECOVERY SERVICES

## 2020-05-31 PROCEDURE — 700105 HCHG RX REV CODE 258: Performed by: OBSTETRICS & GYNECOLOGY

## 2020-05-31 PROCEDURE — 85025 COMPLETE CBC W/AUTO DIFF WBC: CPT

## 2020-05-31 PROCEDURE — 0HQ9XZZ REPAIR PERINEUM SKIN, EXTERNAL APPROACH: ICD-10-PCS | Performed by: OBSTETRICS & GYNECOLOGY

## 2020-05-31 RX ORDER — MISOPROSTOL 200 UG/1
600 TABLET ORAL
Status: DISCONTINUED | OUTPATIENT
Start: 2020-05-31 | End: 2020-06-01 | Stop reason: HOSPADM

## 2020-05-31 RX ORDER — OXYCODONE HYDROCHLORIDE AND ACETAMINOPHEN 5; 325 MG/1; MG/1
1 TABLET ORAL EVERY 4 HOURS PRN
Status: DISCONTINUED | OUTPATIENT
Start: 2020-05-31 | End: 2020-06-01 | Stop reason: HOSPADM

## 2020-05-31 RX ORDER — ROPIVACAINE HYDROCHLORIDE 2 MG/ML
INJECTION, SOLUTION EPIDURAL; INFILTRATION; PERINEURAL CONTINUOUS
Status: DISCONTINUED | OUTPATIENT
Start: 2020-05-31 | End: 2020-06-01 | Stop reason: HOSPADM

## 2020-05-31 RX ORDER — SODIUM CHLORIDE, SODIUM LACTATE, POTASSIUM CHLORIDE, CALCIUM CHLORIDE 600; 310; 30; 20 MG/100ML; MG/100ML; MG/100ML; MG/100ML
INJECTION, SOLUTION INTRAVENOUS PRN
Status: DISCONTINUED | OUTPATIENT
Start: 2020-05-31 | End: 2020-06-01 | Stop reason: HOSPADM

## 2020-05-31 RX ORDER — METHYLERGONOVINE MALEATE 0.2 MG/ML
0.2 INJECTION INTRAVENOUS
Status: DISCONTINUED | OUTPATIENT
Start: 2020-05-31 | End: 2020-06-01 | Stop reason: HOSPADM

## 2020-05-31 RX ORDER — IBUPROFEN 600 MG/1
600 TABLET ORAL EVERY 6 HOURS PRN
Status: DISCONTINUED | OUTPATIENT
Start: 2020-05-31 | End: 2020-06-01 | Stop reason: HOSPADM

## 2020-05-31 RX ORDER — SODIUM CHLORIDE, SODIUM LACTATE, POTASSIUM CHLORIDE, AND CALCIUM CHLORIDE .6; .31; .03; .02 G/100ML; G/100ML; G/100ML; G/100ML
1000 INJECTION, SOLUTION INTRAVENOUS
Status: DISCONTINUED | OUTPATIENT
Start: 2020-05-31 | End: 2020-06-01 | Stop reason: HOSPADM

## 2020-05-31 RX ORDER — OXYCODONE HYDROCHLORIDE AND ACETAMINOPHEN 5; 325 MG/1; MG/1
1 TABLET ORAL EVERY 6 HOURS PRN
Status: DISCONTINUED | OUTPATIENT
Start: 2020-05-31 | End: 2020-05-31

## 2020-05-31 RX ORDER — BUPIVACAINE HYDROCHLORIDE 2.5 MG/ML
INJECTION, SOLUTION EPIDURAL; INFILTRATION; INTRACAUDAL
Status: COMPLETED | OUTPATIENT
Start: 2020-05-31 | End: 2020-05-31

## 2020-05-31 RX ORDER — IBUPROFEN 600 MG/1
600 TABLET ORAL EVERY 6 HOURS PRN
Status: DISCONTINUED | OUTPATIENT
Start: 2020-05-31 | End: 2020-05-31

## 2020-05-31 RX ORDER — BISACODYL 10 MG
10 SUPPOSITORY, RECTAL RECTAL PRN
Status: DISCONTINUED | OUTPATIENT
Start: 2020-05-31 | End: 2020-06-01 | Stop reason: HOSPADM

## 2020-05-31 RX ORDER — OXYCODONE HYDROCHLORIDE AND ACETAMINOPHEN 5; 325 MG/1; MG/1
2 TABLET ORAL EVERY 4 HOURS PRN
Status: DISCONTINUED | OUTPATIENT
Start: 2020-05-31 | End: 2020-06-01 | Stop reason: HOSPADM

## 2020-05-31 RX ORDER — SODIUM CHLORIDE, SODIUM LACTATE, POTASSIUM CHLORIDE, CALCIUM CHLORIDE 600; 310; 30; 20 MG/100ML; MG/100ML; MG/100ML; MG/100ML
INJECTION, SOLUTION INTRAVENOUS CONTINUOUS
Status: DISPENSED | OUTPATIENT
Start: 2020-05-31 | End: 2020-05-31

## 2020-05-31 RX ORDER — SODIUM CHLORIDE, SODIUM LACTATE, POTASSIUM CHLORIDE, AND CALCIUM CHLORIDE .6; .31; .03; .02 G/100ML; G/100ML; G/100ML; G/100ML
250 INJECTION, SOLUTION INTRAVENOUS PRN
Status: DISCONTINUED | OUTPATIENT
Start: 2020-05-31 | End: 2020-06-01 | Stop reason: HOSPADM

## 2020-05-31 RX ORDER — ROPIVACAINE HYDROCHLORIDE 2 MG/ML
INJECTION, SOLUTION EPIDURAL; INFILTRATION; PERINEURAL
Status: COMPLETED
Start: 2020-05-31 | End: 2020-05-31

## 2020-05-31 RX ORDER — DEXTROSE, SODIUM CHLORIDE, SODIUM LACTATE, POTASSIUM CHLORIDE, AND CALCIUM CHLORIDE 5; .6; .31; .03; .02 G/100ML; G/100ML; G/100ML; G/100ML; G/100ML
INJECTION, SOLUTION INTRAVENOUS CONTINUOUS
Status: DISCONTINUED | OUTPATIENT
Start: 2020-05-31 | End: 2020-06-01 | Stop reason: HOSPADM

## 2020-05-31 RX ORDER — VITAMIN A ACETATE, BETA CAROTENE, ASCORBIC ACID, CHOLECALCIFEROL, .ALPHA.-TOCOPHEROL ACETATE, DL-, THIAMINE MONONITRATE, RIBOFLAVIN, NIACINAMIDE, PYRIDOXINE HYDROCHLORIDE, FOLIC ACID, CYANOCOBALAMIN, CALCIUM CARBONATE, FERROUS FUMARATE, ZINC OXIDE, CUPRIC OXIDE 3080; 12; 120; 400; 1; 1.84; 3; 20; 22; 920; 25; 200; 27; 10; 2 [IU]/1; UG/1; MG/1; [IU]/1; MG/1; MG/1; MG/1; MG/1; MG/1; [IU]/1; MG/1; MG/1; MG/1; MG/1; MG/1
1 TABLET, FILM COATED ORAL EVERY MORNING
Status: DISCONTINUED | OUTPATIENT
Start: 2020-05-31 | End: 2020-06-01 | Stop reason: HOSPADM

## 2020-05-31 RX ORDER — DOCUSATE SODIUM 100 MG/1
100 CAPSULE, LIQUID FILLED ORAL 2 TIMES DAILY PRN
Status: DISCONTINUED | OUTPATIENT
Start: 2020-05-31 | End: 2020-06-01 | Stop reason: HOSPADM

## 2020-05-31 RX ORDER — ONDANSETRON 4 MG/1
4 TABLET, ORALLY DISINTEGRATING ORAL EVERY 6 HOURS PRN
Status: DISCONTINUED | OUTPATIENT
Start: 2020-05-31 | End: 2020-06-01 | Stop reason: HOSPADM

## 2020-05-31 RX ORDER — ALUMINA, MAGNESIA, AND SIMETHICONE 2400; 2400; 240 MG/30ML; MG/30ML; MG/30ML
30 SUSPENSION ORAL EVERY 6 HOURS PRN
Status: DISCONTINUED | OUTPATIENT
Start: 2020-05-31 | End: 2020-06-01 | Stop reason: HOSPADM

## 2020-05-31 RX ORDER — CARBOPROST TROMETHAMINE 250 UG/ML
250 INJECTION, SOLUTION INTRAMUSCULAR
Status: DISCONTINUED | OUTPATIENT
Start: 2020-05-31 | End: 2020-06-01 | Stop reason: HOSPADM

## 2020-05-31 RX ORDER — ONDANSETRON 2 MG/ML
4 INJECTION INTRAMUSCULAR; INTRAVENOUS EVERY 6 HOURS PRN
Status: DISCONTINUED | OUTPATIENT
Start: 2020-05-31 | End: 2020-06-01 | Stop reason: HOSPADM

## 2020-05-31 RX ADMIN — IBUPROFEN 600 MG: 600 TABLET ORAL at 16:40

## 2020-05-31 RX ADMIN — ROPIVACAINE HYDROCHLORIDE 200 MG: 2 INJECTION, SOLUTION EPIDURAL; INFILTRATION at 14:07

## 2020-05-31 RX ADMIN — OXYTOCIN 2 MILLI-UNITS/MIN: 10 INJECTION, SOLUTION INTRAMUSCULAR; INTRAVENOUS at 10:26

## 2020-05-31 RX ADMIN — SODIUM CHLORIDE, POTASSIUM CHLORIDE, SODIUM LACTATE AND CALCIUM CHLORIDE: 600; 310; 30; 20 INJECTION, SOLUTION INTRAVENOUS at 10:26

## 2020-05-31 RX ADMIN — SODIUM CHLORIDE, POTASSIUM CHLORIDE, SODIUM LACTATE AND CALCIUM CHLORIDE: 600; 310; 30; 20 INJECTION, SOLUTION INTRAVENOUS at 13:50

## 2020-05-31 RX ADMIN — BUPIVACAINE HYDROCHLORIDE 10 ML: 2.5 INJECTION, SOLUTION EPIDURAL; INFILTRATION; INTRACAUDAL; PERINEURAL at 13:51

## 2020-05-31 RX ADMIN — ROPIVACAINE HYDROCHLORIDE 200 MG: 2 INJECTION, SOLUTION EPIDURAL; INFILTRATION; PERINEURAL at 14:07

## 2020-05-31 RX ADMIN — OXYTOCIN 125 ML/HR: 10 INJECTION, SOLUTION INTRAMUSCULAR; INTRAVENOUS at 16:44

## 2020-05-31 ASSESSMENT — EDINBURGH POSTNATAL DEPRESSION SCALE (EPDS)
I HAVE BEEN SO UNHAPPY THAT I HAVE BEEN CRYING: NO, NEVER
I HAVE FELT SCARED OR PANICKY FOR NO GOOD REASON: NO, NOT AT ALL
I HAVE BEEN ANXIOUS OR WORRIED FOR NO GOOD REASON: NO, NOT AT ALL
I HAVE BLAMED MYSELF UNNECESSARILY WHEN THINGS WENT WRONG: NOT VERY OFTEN
I HAVE FELT SAD OR MISERABLE: NO, NOT AT ALL
I HAVE LOOKED FORWARD WITH ENJOYMENT TO THINGS: AS MUCH AS I EVER DID
I HAVE BEEN ABLE TO LAUGH AND SEE THE FUNNY SIDE OF THINGS: AS MUCH AS I ALWAYS COULD
THINGS HAVE BEEN GETTING ON TOP OF ME: NO, I HAVE BEEN COPING AS WELL AS EVER
I HAVE BEEN SO UNHAPPY THAT I HAVE HAD DIFFICULTY SLEEPING: NOT AT ALL
THE THOUGHT OF HARMING MYSELF HAS OCCURRED TO ME: NEVER

## 2020-05-31 ASSESSMENT — PATIENT HEALTH QUESTIONNAIRE - PHQ9
SUM OF ALL RESPONSES TO PHQ9 QUESTIONS 1 AND 2: 0
1. LITTLE INTEREST OR PLEASURE IN DOING THINGS: NOT AT ALL
2. FEELING DOWN, DEPRESSED, IRRITABLE, OR HOPELESS: NOT AT ALL

## 2020-05-31 ASSESSMENT — LIFESTYLE VARIABLES
ON A TYPICAL DAY WHEN YOU DRINK ALCOHOL HOW MANY DRINKS DO YOU HAVE: 0
TOTAL SCORE: 0
EVER HAD A DRINK FIRST THING IN THE MORNING TO STEADY YOUR NERVES TO GET RID OF A HANGOVER: NO
ALCOHOL_USE: NO
TOTAL SCORE: 0
CONSUMPTION TOTAL: NEGATIVE
TOTAL SCORE: 0
HAVE PEOPLE ANNOYED YOU BY CRITICIZING YOUR DRINKING: NO
EVER FELT BAD OR GUILTY ABOUT YOUR DRINKING: NO
HOW MANY TIMES IN THE PAST YEAR HAVE YOU HAD 5 OR MORE DRINKS IN A DAY: 0
HAVE YOU EVER FELT YOU SHOULD CUT DOWN ON YOUR DRINKING: NO
AVERAGE NUMBER OF DAYS PER WEEK YOU HAVE A DRINK CONTAINING ALCOHOL: 0

## 2020-05-31 ASSESSMENT — PAIN SCALES - GENERAL: PAIN_LEVEL: 0

## 2020-05-31 NOTE — ANESTHESIA POSTPROCEDURE EVALUATION
Patient: Ericka Tejeda    Procedure Summary     Date:  05/31/20 Room / Location:      Anesthesia Start:  1337 Anesthesia Stop:  1513    Procedure:  Labor Epidural Diagnosis:      Scheduled Providers:   Responsible Provider:  Fernando Anderson M.D.    Anesthesia Type:  epidural ASA Status:  2          Final Anesthesia Type: epidural  Last vitals  BP   Blood Pressure: (!) 161/73    Temp        Pulse   Pulse: 78   Resp        SpO2   100 %      Anesthesia Post Evaluation    Pain score: 0    Airway patency: patent  Anesthetic complications: no  Cardiovascular status: hemodynamically stable  Respiratory status: acceptable  Hydration status: stable

## 2020-05-31 NOTE — PROGRESS NOTES
OB Progress Note    Feeling her contractions. Thinking she may want an epidural. Baseline 120's. Moderate variability. [place] with contractions every 2-3 min. SVE 6/80/-2. AROM clear. FSE placed. Epidural now. Anticipate vaginal delivery.     Gina Black M.D.

## 2020-05-31 NOTE — ANESTHESIA QCDR
2019 [facility] Clinical Data Registry (for Quality Improvement)     Postoperative nausea/vomiting risk protocol (Adult = 18 yrs and Pediatric 3-17 yrs)- (430 and 463)  General inhalation anesthetic (NOT TIVA) with PONV risk factors: No  Provision of anti-emetic therapy with at least 2 different classes of agents: N/A  Patient DID NOT receive anti-emetic therapy and reason is documented in Medical Record: N/A    Multimodal Pain Management- (477)  Non-emergent surgery AND patient age >= 18: No  Use of Multimodal Pain Management, two or more drugs and/or interventions, NOT including systemic opioids:   Exception: Documented allergy to multiple classes of analgesics:     Smoking Abstinence (404)  Patient is current smoker (cigarette, pipe, e-cig, marijuanna): No  Elective Surgery:   Abstinence instructions provided prior to day of surgery:   Patient abstained from smoking on day of surgery:     Pre-Op Beta-Blocker in Isolated CABG (44)  Isolated CABG AND patient age >= 18: No  Beta-blocker admin within 24 hours of surgical incision:   Exception:of medical reason(s) for not administering beta blocker within 24 hours prior to surgical incision (e.g., not  indicated,other medical reason):     PACU assessment of acute postoperative pain prior to Anesthesia Care End- Applies to Patients Age = 18- (ABG7)  Initial PACU pain score is which of the following: < 7/10  Patient unable to report pain score: N/A    Post-anesthetic transfer of care checklist/protocol to PACU/ICU- (426 and 427)  Upon conclusion of case, patient transferred to which of the following locations: PACU/Non-ICU  Use of transfer checklist/protocol: Yes  Exclusion: Service Performed in Patient Hospital Room (and thus did not require transfer): N/A  Unplanned admission to ICU related to anesthesia service up through end of PACU care- (MD51)  Unplanned admission to ICU (not initially anticipated at anesthesia start time): No

## 2020-05-31 NOTE — L&D DELIVERY NOTE
"Delivery Summary    Ericka Tejeda is a 23 yo  who presented at 40w4d for term induction of labor. She was induced with pitocin. She underwent AROM. She received an epidural for anesthesia. She progressed to complete dilation and developed a category II strip with prolonged decelerations. With pushing efforts she underwent a spontaneous vaginal delivery of a viable male infant \"Laura West\" in EZRA position with APGARS 8/9. The head delivered atraumatically. The anterior shoulder delivered with gentle downward pressure and the remainder of the body followed. The infant was placed on the maternal chest. The cord was clamped and cut after a 30 second delay. Pitocin was administered. The placenta was delivered with gentle pressure. The uterus firmed with fundal pressure and pitocin. The perineum was examined and bilateral labials and a periurethral laceration were repaired with 3.0 chromic in the usual fashion.    EBL: 250cc  Anesthesia: epidural  Complications: None    Gina Black M.D.      "

## 2020-05-31 NOTE — ANESTHESIA PROCEDURE NOTES
Epidural Block    Date/Time: 5/31/2020 1:51 PM  Performed by: Fernando Anderson M.D.  Authorized by: Fernando Anderson M.D.     Patient Location:  OB  Start Time:  5/31/2020 1:51 PM  Reason for Block: at surgeon's request, post-op pain management and labor analgesia    patient identified, IV checked, site marked, risks and benefits discussed, surgical consent, monitors and equipment checked, pre-op evaluation and timeout performed    Patient Position:  Sitting  Prep: ChloraPrep, patient draped and sterile technique    Monitoring:  Blood pressure, continuous pulse oximetry and heart rate  Approach:  Midline  Location:  L3-L4  Injection Technique:  TIBURCIO saline  Skin infiltration:  Lidocaine  Strength:  1%  Dose:  3ml  Needle Type:  Tuohy  Needle Gauge:  17 G  Needle Length:  3.5 in  Loss of resistance::  5  Catheter Size:  19 G  Catheter at Skin Depth:  5  Test Dose Result:  Negative

## 2020-05-31 NOTE — ANESTHESIA TIME REPORT
Anesthesia Start and Stop Event Times     Date Time Event    5/31/2020 1337 Anesthesia Start     1351 Ready for Procedure     1513 Anesthesia Stop        Responsible Staff  05/31/20    Name Role Begin End    Fernando Anderson M.D. Anesth 1337 1513        Preop Diagnosis (Free Text):  Pre-op Diagnosis             Preop Diagnosis (Codes):    Post op Diagnosis  Vaginal delivery      Premium Reason  E. Weekend    Comments:

## 2020-05-31 NOTE — PROGRESS NOTES
1513  by Dr. Black. Male, apgars 8 & 9. Spontaneous delivery of placenta, intact and normal. Cord gases obtained. Arterial Ph 7.26. Bilateral periurethral lacerations with repair.

## 2020-06-01 VITALS
WEIGHT: 160 LBS | SYSTOLIC BLOOD PRESSURE: 119 MMHG | HEIGHT: 63 IN | RESPIRATION RATE: 18 BRPM | TEMPERATURE: 97.9 F | DIASTOLIC BLOOD PRESSURE: 72 MMHG | HEART RATE: 61 BPM | OXYGEN SATURATION: 99 % | BODY MASS INDEX: 28.35 KG/M2

## 2020-06-01 LAB
ERYTHROCYTE [DISTWIDTH] IN BLOOD BY AUTOMATED COUNT: 42.5 FL (ref 35.9–50)
HCT VFR BLD AUTO: 39.2 % (ref 37–47)
HGB BLD-MCNC: 13.2 G/DL (ref 12–16)
MCH RBC QN AUTO: 31.8 PG (ref 27–33)
MCHC RBC AUTO-ENTMCNC: 33.7 G/DL (ref 33.6–35)
MCV RBC AUTO: 94.5 FL (ref 81.4–97.8)
PLATELET # BLD AUTO: 113 K/UL (ref 164–446)
PMV BLD AUTO: 13 FL (ref 9–12.9)
RBC # BLD AUTO: 4.15 M/UL (ref 4.2–5.4)
WBC # BLD AUTO: 15.5 K/UL (ref 4.8–10.8)

## 2020-06-01 PROCEDURE — A9270 NON-COVERED ITEM OR SERVICE: HCPCS | Performed by: OBSTETRICS & GYNECOLOGY

## 2020-06-01 PROCEDURE — 85027 COMPLETE CBC AUTOMATED: CPT

## 2020-06-01 PROCEDURE — 700102 HCHG RX REV CODE 250 W/ 637 OVERRIDE(OP): Performed by: OBSTETRICS & GYNECOLOGY

## 2020-06-01 PROCEDURE — 36415 COLL VENOUS BLD VENIPUNCTURE: CPT

## 2020-06-01 RX ORDER — IBUPROFEN 600 MG/1
600 TABLET ORAL EVERY 6 HOURS PRN
Qty: 30 TAB | Refills: 30 | Status: SHIPPED | OUTPATIENT
Start: 2020-06-01

## 2020-06-01 RX ADMIN — VITAMIN A, VITAMIN C, VITAMIN D-3, VITAMIN E, VITAMIN B-1, VITAMIN B-2, NIACIN, VITAMIN B-6, CALCIUM, IRON, ZINC, COPPER 1 TABLET: 4000; 120; 400; 22; 1.84; 3; 20; 10; 1; 12; 200; 27; 25; 2 TABLET ORAL at 05:44

## 2020-06-01 RX ADMIN — IBUPROFEN 600 MG: 600 TABLET ORAL at 08:20

## 2020-06-01 RX ADMIN — IBUPROFEN 600 MG: 600 TABLET ORAL at 02:27

## 2020-06-01 NOTE — CARE PLAN
Problem: Altered physiologic condition related to immediate post-delivery state and potential for bleeding/hemorrhage  Goal: Patient physiologically stable as evidenced by normal lochia, palpable uterine involution and vital signs within normal limits  Outcome: PROGRESSING AS EXPECTED  Note: Fundus firm, light lochia. Will continue to monitor.     Problem: Alteration in comfort related to episiotomy, vaginal repair and/or after birth pains  Goal: Patient is able to ambulate, care for self and infant  Outcome: PROGRESSING AS EXPECTED  Note: Ambulatory. Demonstrates self care and care for infant.  Goal: Patient verbalizes acceptable pain level  Outcome: PROGRESSING AS EXPECTED  Note: Reports pain to be tolerable at this time. Discussed pain mgmt with pt.

## 2020-06-01 NOTE — PROGRESS NOTES
0700 Bedside report received from Ruthann Smith RN, care plan reviewed, care assumed, requests Ibuprofen when next due.    0810 Assessment complete.

## 2020-06-01 NOTE — PROGRESS NOTES
Discharge home with  and infant. All questions answered. Instructions given on infant and self care, reasons to call the doctor, has already scheduled follow up appointments for self and infant

## 2020-06-01 NOTE — CONSULTS
Met with MOB for an initial lactation visit.  MOB delivered her second baby yesterday, 05/31/20, at 1513 at 40.5 weeks gestation.  Infant is approximately 18 hours old.  Lactation assistance was offered, but MOB declined.  MOB stated infant is latching onto the breast without difficulty and is feeding well. MOB denied pain and tissue damage to nipples and areolas with latch.    Reviewed feeding cues with parents of infant.    Provided MOB with written and verbal information on the breastfeeding assistance available to her through the [facility] and the [facility] [place] (via Zoom).    Breastfeeding Plan:  Offer infant the breast per feeding cues and within 3 hours from the last feed for a minimum of 8 or more feeds in a 24 hour period.  If infant unable to latch onto the breast between now and when infant turns 24 hours old, MOB should be encouraged to hand express colostrum onto a spoon and feed back expressed breast milk to infant.    MOB verbalized understanding of all information provided to her and denied having any further questions at this time.  Encouraged MOB to call for lactation assistance as needed.

## 2020-06-01 NOTE — PROGRESS NOTES
Assessment completed, fundus firm, lochia light. POC reviewed, verbalized understanding. Reports pain to be tolerable at this time. Encouraged to call if needed pain interventions.

## 2020-06-01 NOTE — PROGRESS NOTES
1818-Patient transferred from labor and delivery. Two RN verification of infant and parent armbands. Report received from April RN. Patient oriented to unit, call light, emergency light, and infant security. Assessment completed, fundus firm, lochia light. Patient has already voided and is doing so without problems, per report from April RN and pt. Patient declines intervention for pain at this time. Pitocin infusing at 125 ml/hr. Patient encouraged to call with needs.

## 2020-06-01 NOTE — DISCHARGE SUMMARY
"Discharge Summary    Admission Date: 2020    Discharge Date: 2020    Diagnosis:Active Problems:     (spontaneous vaginal delivery)    Subjective: Pain controlled. Normal lochia. Eating, voiding and ambulating without difficulty. Breast feeding.     /94   Pulse 74   Temp 36.9 °C (98.4 °F) (Temporal)   Resp 18   Ht 1.6 m (5' 3\")   Wt 72.6 kg (160 lb)   SpO2 96%   Breastfeeding Yes   BMI 28.34 kg/m²     GEN: NAD  GI:soft, NT, ND  :fundus firm and below umbilicus  EXT:no edema    Hospital Course:Ericka Tejeda is a 25 yo  who presented at 40w5d for an elective induction of labor. She is s/p  of a viable male infant \"[place]\" with APGARS 8/9. EBL of 250cc. Bilateral labial and a periurethral laceration were repaired. She was meeting all post partum goals and requesting discharge home on PPD#1.     Discharge Instructions   1. Diet : general  2. Activity: Pelvic rest for 6 weeks     Ericka Tejeda [place]   Home Medication Instructions KIANA:[id number]    Printed on:20 1099   Medication Information                      ibuprofen (MOTRIN) 600 MG Tab  Take 1 Tab by mouth every 6 hours as needed (For cramping after delivery; do not give if patient is receiving ketorolac (Toradol)).             Prenatal w/o A Vit-Fe Fum-FA (PRENATA PO)  Take  by mouth.                 Follow up: 6 weeks    Complications:none    Gina Black M.D.    "

## 2021-07-07 ENCOUNTER — HOSPITAL ENCOUNTER (OUTPATIENT)
Dept: LAB | Facility: MEDICAL CENTER | Age: 26
End: 2021-07-07
Attending: STUDENT IN AN ORGANIZED HEALTH CARE EDUCATION/TRAINING PROGRAM
Payer: MEDICAID

## 2021-11-17 NOTE — DISCHARGE INSTRUCTIONS
POSTPARTUM DISCHARGE INSTRUCTIONS FOR MOM    YOB: 1995   Age: 24 y.o.               Admit Date: 2020     Discharge Date: 2020  Attending Doctor:  Annamarie Romano M.D.                  Allergies:  Patient has no known allergies.    Discharged to home by car. Discharged via wheelchair, hospital escort: Yes.  Special equipment needed: Not Applicable  Belongings with: Personal  Be sure to schedule a follow-up appointment with your primary care doctor or any specialists as instructed.     Discharge Plan:   Diet Plan: Discussed  Activity Level: Discussed  Confirmed Follow up Appointment: Patient to Call and Schedule Appointment  Confirmed Symptoms Management: Discussed  Medication Reconciliation Updated: Yes    REASONS TO CALL YOUR OBSTETRICIAN:  1.   Persistent fever or shaking chills (Temperature higher than 100.4)  2.   Heavy bleeding (soaking more than 1 pad per hour); Passing clots  3.   Foul odor from vagina  4.   Mastitis (Breast infection; breast pain, chills, fever, redness)  5.   Urinary pain, burning or frequency  6.   Episiotomy infection  7.   Abdominal incision infection  8.   Severe depression longer than 24 hours    HAND WASHING  · Prior to handling the baby.  · Before breastfeeding or bottle feeding baby.  · After using the bathroom or changing the baby's diaper.    WOUND CARE  Ask your physician for additional care instructions.  In general:    ·  Incision:      · Keep clean and dry.    · Do NOT lift anything heavier than your baby for up to 6 weeks.    · There should not be any opening or pus.      VAGINAL CARE  · Nothing inside vagina for 6 weeks: no sexual intercourse, tampons or douching.  · Bleeding may continue for 2-4 weeks.  Amount may vary.    · Call your physician for heavy bleeding which means soaking more than 1 pad per hour    BIRTH CONTROL  · It is possible to become pregnant at any time after delivery and while breastfeeding.  · Plan to discuss a method of  Patient achieved a 15% reduction in IOP from pretreatment levels or a plan is in place to achieve this goal. "birth control with your physician at your follow up visit. visit.    DIET AND ELIMINATION  · Eating more fiber (bran cereal, fruits, and vegetables) and drinking plenty of fluids will help to avoid constipation.  · Urinary frequency after childbirth is normal.    POSTPARTUM BLUES  During the first few days after birth, you may experience a sense of the \"blues\" which may include impatience, irritability or even crying.  These feeling come and go quickly.  However, as many as 1 in 10 women experience emotional symptoms known as postpartum depression.    Postpartum depression:  May start as early as the second or third day after delivery or take several weeks or months to develop.  Symptoms of \"blues\" are present, but are more intense:  Crying spells; loss of appetite; feelings of hopelessness or loss of control; fear of touching the baby; over concern or no concern at all about the baby; little or no concern about your own appearance/caring for yourself; and/or inability to sleep or excessive sleeping.  Contact your physician if you are experiencing any of these symptoms.    Crisis Hotline:  · [place] Crisis Hotline:  5-464-HUTHXTZ  Or 1-773.185.5016  · [place] Crisis Hotline:  1-589.845.1124  Or 959-214-5352    PREVENTING SHAKEN BABY:  If you are angry or stressed, PUT THE BABY IN THE CRIB, step away, take some deep breaths, and wait until you are calm to care for the baby.  DO NOT SHAKE THE BABY.  You are not alone, call a supporter for help.    · Crisis Call Center 24/7 crisis line 118-106-1336 or 1-125.428.1215  · You can also text them, text \"ANSWER\" to 847753    QUIT SMOKING/TOBACCO USE:  I understand the use of any tobacco products increases my chance of suffering from future heart disease and could cause other illnesses which may shorten my life. Quitting the use of tobacco products is the single most important thing I can do to improve my health. For further information on smoking / tobacco cessation call a " Toll Free Quit Line at 1-646.395.9364 (*[employer]) or 1-522.399.3933 (American Lung Association) or you can access the web based program at www.lungusa.org.    · [place] Tobacco Users Help Line:  (984) 582-6394       Toll Free: 1-162.949.8328  · Quit Tobacco Program [facility] (355)634-5283    DEPRESSION / SUICIDE RISK:  As you are discharged from this [facility], it is important to learn how to keep safe from harming yourself.    Recognize the warning signs:  · Abrupt changes in personality, positive or negative- including increase in energy   · Giving away possessions  · Change in eating patterns- significant weight changes-  positive or negative  · Change in sleeping patterns- unable to sleep or sleeping all the time   · Unwillingness or inability to communicate  · Depression  · Unusual sadness, discouragement and loneliness  · Talk of wanting to die  · Neglect of personal appearance   · Rebelliousness- reckless behavior  · Withdrawal from people/activities they love  · Confusion- inability to concentrate     If you or a loved one observes any of these behaviors or has concerns about self-harm, here's what you can do:  · Talk about it- your feelings and reasons for harming yourself  · Remove any means that you might use to hurt yourself (examples: pills, rope, extension cords, firearm)  · Get professional help from the community (Mental Health, Substance Abuse, psychological counseling)  · Do not be alone:Call your Safe Contact- someone whom you trust who will be there for you.  · Call your local CRISIS HOTLINE 639-6076 or 836-313-0575  · Call your local Children's Mobile Crisis Response Team [place] (062) 777-5716 or www.Anpro21  · Call the toll free National Suicide Prevention Hotlines   · National Suicide Prevention Lifeline 183-409-ALVX (9000)  · National Hope Line Network 800-SUICIDE (652-7524)    DISCHARGE SURVEY:  Thank you for choosing  [facility].  We hope we provided you with very good care.  You may be receiving a survey in the mail.  Please fill it out.  Your opinion is valuable to us.    ADDITIONAL EDUCATIONAL MATERIALS GIVEN TO PATIENT:        My signature on this form indicates that:  1.  I have reviewed and understand the above information  2.  My questions regarding this information have been answered to my satisfaction.  3.  I have formulated a plan with my discharge nurse to obtain my prescribed medication for home.

## 2021-12-27 ENCOUNTER — APPOINTMENT (OUTPATIENT)
Dept: MEDICAL GROUP | Facility: CLINIC | Age: 26
End: 2021-12-27
Payer: MEDICAID

## 2022-02-08 ENCOUNTER — OFFICE VISIT (OUTPATIENT)
Dept: MEDICAL GROUP | Facility: CLINIC | Age: 27
End: 2022-02-08
Payer: MEDICAID

## 2022-02-08 VITALS
WEIGHT: 133 LBS | SYSTOLIC BLOOD PRESSURE: 115 MMHG | BODY MASS INDEX: 23.57 KG/M2 | HEART RATE: 71 BPM | DIASTOLIC BLOOD PRESSURE: 77 MMHG | OXYGEN SATURATION: 98 % | HEIGHT: 63 IN

## 2022-02-08 DIAGNOSIS — L70.0 ACNE VULGARIS: ICD-10-CM

## 2022-02-08 DIAGNOSIS — Z30.09 FAMILY PLANNING: ICD-10-CM

## 2022-02-08 DIAGNOSIS — B00.9 HSV-2 (HERPES SIMPLEX VIRUS 2) INFECTION: ICD-10-CM

## 2022-02-08 PROCEDURE — 99213 OFFICE O/P EST LOW 20 MIN: CPT | Mod: GE | Performed by: STUDENT IN AN ORGANIZED HEALTH CARE EDUCATION/TRAINING PROGRAM

## 2022-02-08 RX ORDER — MINOCYCLINE HYDROCHLORIDE 50 MG/1
50 CAPSULE ORAL
COMMUNITY
Start: 2021-07-06 | End: 2022-04-06

## 2022-02-08 RX ORDER — DOXYCYCLINE HYCLATE 100 MG
100 TABLET ORAL DAILY
Qty: 30 TABLET | Refills: 11 | Status: SHIPPED | OUTPATIENT
Start: 2022-02-08

## 2022-02-08 RX ORDER — VALACYCLOVIR HYDROCHLORIDE 500 MG/1
500 TABLET, FILM COATED ORAL 2 TIMES DAILY
COMMUNITY
End: 2022-02-08 | Stop reason: SDUPTHER

## 2022-02-08 RX ORDER — VALACYCLOVIR HYDROCHLORIDE 500 MG/1
500 TABLET, FILM COATED ORAL DAILY
Qty: 30 TABLET | Refills: 11 | Status: SHIPPED | OUTPATIENT
Start: 2022-02-08

## 2022-02-08 RX ORDER — MINOCYCLINE HYDROCHLORIDE 50 MG/1
CAPSULE ORAL
COMMUNITY
Start: 2021-12-06 | End: 2022-04-06

## 2022-02-09 NOTE — ASSESSMENT & PLAN NOTE
Patient has completed order form for Nexplanon.  She is aware that we will call her for an insertion appointment once it arrives.

## 2022-02-09 NOTE — ASSESSMENT & PLAN NOTE
Reviewed dosage range on up-to-date regarding doxycycline for acne.  Will start at 100 mg daily.  If this is not sufficient, we can consider up titrating to 100 mg twice daily.    At next visit, will ask patient when the last time is she tried topical antibiotics and offered trial of topical antibiotics rather than systemic if she is amenable.    She is also had difficulty filling her Retin-A due to insurance issues.  I have asked her to request that her pharmacy send us a prior authorization form if necessary so that we can fill this medication for her.    Review of Sharmila comp reveals no interaction between Nexplanon, doxycycline and valacyclovir.

## 2022-02-09 NOTE — PROGRESS NOTES
"Subjective:     CC: Medication refills    HPI:   Ericka presents today with the above chief complaint    Problem   Hsv-2 (Herpes Simplex Virus 2) Infection    Patient with established history of HSV-2.  Formally taking as needed valacyclovir but recently has been taking daily without any outbreaks.  Request daily prescription today.     Acne Vulgaris    She wasPatient with long standing history of acne.  Currently taking doxycycline but switched to minocycline while pregnant.  She states this does not work well for her, and would like to switch back to doxycycline.     Family Planning    Patient interested in Nexplanon today.  She states she got pregnant while she had an IUD in place, and is not interested in contraceptive pills, the ring or Depo shots.  She has not had Nexplanon before.         Current Outpatient Medications Ordered in Epic   Medication Sig Dispense Refill   • minocycline (MINOCIN) 50 MG Cap 50 mg.     • doxycycline (VIBRAMYCIN) 100 MG Tab Take 1 Tablet by mouth every day. 30 Tablet 11   • valACYclovir (VALTREX) 500 MG Tab Take 1 Tablet by mouth every day. 30 Tablet 11   • minocycline (MINOCIN) 50 MG Cap      • ibuprofen (MOTRIN) 600 MG Tab Take 1 Tab by mouth every 6 hours as needed (For cramping after delivery; do not give if patient is receiving ketorolac (Toradol)). 30 Tab 30   • Prenatal w/o A Vit-Fe Fum-FA (PRENATA PO) Take  by mouth.       No current Epic-ordered facility-administered medications on file.       Health Maintenance: Deferred medication refills    ROS:  Gen: no fevers/chills, no changes in weight  Eyes: no changes in vision  ENT: no sore throat, no hearing loss, no bloody nose  Pulm: no sob, no cough  CV: no chest pain, no palpitations  GI: no nausea/vomiting, no diarrhea  : no dysuria  MSk: no myalgias  Skin: no rash  Neuro: no headaches, no numbness/tingling  Heme/Lymph: no easy bruising      Objective:     Exam:  /77   Pulse 71   Ht 1.6 m (5' 3\")   Wt 60.3 kg " (133 lb)   SpO2 98%   BMI 23.56 kg/m²  Body mass index is 23.56 kg/m².    Gen: Alert and oriented, No apparent distress.  Neck: Neck is supple without lymphadenopathy.  Lungs: Normal effort, CTA bilaterally, no wheezes, rhonchi, or rales  CV: Regular rate and rhythm. No murmurs, rubs, or gallops.  Ext: No clubbing, cyanosis, edema.      Assessment & Plan:     26 y.o. female with the following -     Problem List Items Addressed This Visit     HSV-2 (herpes simplex virus 2) infection     Based on history and review of Sharmila comp, patient appears to be a good candidate for 500 mg daily.  If she does have breakthrough lesions with this, we can uptitrate to 1000 mg daily.         Acne vulgaris     Reviewed dosage range on up-to-date regarding doxycycline for acne.  Will start at 100 mg daily.  If this is not sufficient, we can consider up titrating to 100 mg twice daily.    At next visit, will ask patient when the last time is she tried topical antibiotics and offered trial of topical antibiotics rather than systemic if she is amenable.    She is also had difficulty filling her Retin-A due to insurance issues.  I have asked her to request that her pharmacy send us a prior authorization form if necessary so that we can fill this medication for her.    Review of Sharmila comp reveals no interaction between Nexplanon, doxycycline and valacyclovir.         Relevant Medications    minocycline (MINOCIN) 50 MG Cap    minocycline (MINOCIN) 50 MG Cap    doxycycline (VIBRAMYCIN) 100 MG Tab    Family planning     Patient has completed order form for Nexplanon.  She is aware that we will call her for an insertion appointment once it arrives.                 No follow-ups on file.  Follow-up when Nexplanon arrives.

## 2022-02-09 NOTE — ASSESSMENT & PLAN NOTE
Based on history and review of Sharmila comp, patient appears to be a good candidate for 500 mg daily.  If she does have breakthrough lesions with this, we can uptitrate to 1000 mg daily.

## 2022-04-06 ENCOUNTER — OFFICE VISIT (OUTPATIENT)
Dept: MEDICAL GROUP | Facility: CLINIC | Age: 27
End: 2022-04-06
Payer: MEDICAID

## 2022-04-06 VITALS
OXYGEN SATURATION: 96 % | DIASTOLIC BLOOD PRESSURE: 80 MMHG | SYSTOLIC BLOOD PRESSURE: 120 MMHG | WEIGHT: 132 LBS | TEMPERATURE: 97.6 F | HEIGHT: 63 IN | HEART RATE: 78 BPM | BODY MASS INDEX: 23.39 KG/M2

## 2022-04-06 DIAGNOSIS — Z30.017 NEXPLANON INSERTION: ICD-10-CM

## 2022-04-06 DIAGNOSIS — Z30.09 FAMILY PLANNING: ICD-10-CM

## 2022-04-06 NOTE — PROCEDURES
"  Reviewed procedure with patient.  All questions were answered.  Consent obtained.  Post procedure instructions reviewed with patient.  Confirmed the expiration date of the implant on the 's box. NDC number of the implant is [id number]. The implant card was given to the patient.     A negative pregnancy test was obtained before the procedure as well.     PROCEDURE: The left arm was measured and marked- 10 cm proximal to the medial epicondyle, 3 cm inferior to the sulcus.  The area was prepped with chloroprep and infiltrated with  2 cc of 1% lidocaine. The nexplanon device was inserted easily and the implant deployed.  Placement of the implant was confirmed. A pressure dressing was applied.  Patient tolerated procedure well without complications.     The attending physician, Dr. Tonya Martinez, was present for the entire procedure.    -Prior to the procedure, our office contacted the Nexplanon company and confirmed that this implant was covered by her insurance under \" buy and bill\".  This was not to be sent directly to the pharmacy.  We used a Nexplanon implant we had here in our office, and they did confirm that her insurance would pay for the implant.  "

## 2022-04-06 NOTE — PROGRESS NOTES
"Subjective:     CC: Nexplanon insertion    HPI:   Ericka is a 26 y.o. female who presents today for the following problems:    Patient is here for Nexplanon insertion.  She previously had an IUD, but it incidentally fell out of her cervix, and she ended up getting pregnant.  She has not been using birth control recently besides the withdrawal method.  She would like Nexplanon insertion today to be more reliable.    Current Outpatient Medications Ordered in Epic   Medication Sig Dispense Refill   • Emollient (MATT RETINOL CORREXION) Cream Apply 1 Application topically every day. 48 g 3   • doxycycline (VIBRAMYCIN) 100 MG Tab Take 1 Tablet by mouth every day. 30 Tablet 11   • valACYclovir (VALTREX) 500 MG Tab Take 1 Tablet by mouth every day. 30 Tablet 11   • ibuprofen (MOTRIN) 600 MG Tab Take 1 Tab by mouth every 6 hours as needed (For cramping after delivery; do not give if patient is receiving ketorolac (Toradol)). 30 Tab 30   • Prenatal w/o A Vit-Fe Fum-FA (PRENATA PO) Take  by mouth.       No current Epic-ordered facility-administered medications on file.       Objective:     Exam:  /80 (BP Location: Left arm)   Pulse 78   Temp 36.4 °C (97.6 °F)   Ht 1.6 m (5' 3\")   Wt 59.9 kg (132 lb)   SpO2 96%   BMI 23.38 kg/m²  Body mass index is 23.38 kg/m².    Gen:     Alert and oriented, No apparent distress.  HEENT:   NCAT, EOMI  Neck:     Neck is supple without lymphadenopathy.  Lungs:     Normal effort, no audible wheezing  Ext:     No clubbing, cyanosis, edema.      Assessment & Plan:     26 y.o. female with the following -     Problem List Items Addressed This Visit     Family planning      Other Visit Diagnoses     Nexplanon insertion        Relevant Orders    Consent for all Surgical, Special Diagnostic or Therapeutic Procedures        See procedure note  "

## 2022-04-27 ENCOUNTER — HOSPITAL ENCOUNTER (EMERGENCY)
Facility: MEDICAL CENTER | Age: 27
End: 2022-04-27
Attending: EMERGENCY MEDICINE
Payer: MEDICAID

## 2022-04-27 VITALS
SYSTOLIC BLOOD PRESSURE: 109 MMHG | TEMPERATURE: 97 F | OXYGEN SATURATION: 100 % | HEART RATE: 81 BPM | DIASTOLIC BLOOD PRESSURE: 72 MMHG | HEIGHT: 63 IN | BODY MASS INDEX: 22.11 KG/M2 | RESPIRATION RATE: 18 BRPM | WEIGHT: 124.78 LBS

## 2022-04-27 DIAGNOSIS — R11.2 NAUSEA AND VOMITING, INTRACTABILITY OF VOMITING NOT SPECIFIED, UNSPECIFIED VOMITING TYPE: ICD-10-CM

## 2022-04-27 LAB
APPEARANCE UR: CLEAR
BACTERIA #/AREA URNS HPF: NEGATIVE /HPF
BILIRUB UR QL STRIP.AUTO: NEGATIVE
COLOR UR: YELLOW
EPI CELLS #/AREA URNS HPF: ABNORMAL /HPF
GLUCOSE UR STRIP.AUTO-MCNC: NEGATIVE MG/DL
HCG UR QL: NEGATIVE
HYALINE CASTS #/AREA URNS LPF: ABNORMAL /LPF
KETONES UR STRIP.AUTO-MCNC: 40 MG/DL
LEUKOCYTE ESTERASE UR QL STRIP.AUTO: NEGATIVE
MICRO URNS: ABNORMAL
NITRITE UR QL STRIP.AUTO: NEGATIVE
PH UR STRIP.AUTO: 5.5 [PH] (ref 5–8)
PROT UR QL STRIP: NEGATIVE MG/DL
RBC # URNS HPF: ABNORMAL /HPF
RBC UR QL AUTO: ABNORMAL
SP GR UR STRIP.AUTO: 1.03
UROBILINOGEN UR STRIP.AUTO-MCNC: 0.2 MG/DL
WBC #/AREA URNS HPF: ABNORMAL /HPF

## 2022-04-27 PROCEDURE — 700111 HCHG RX REV CODE 636 W/ 250 OVERRIDE (IP): Performed by: EMERGENCY MEDICINE

## 2022-04-27 PROCEDURE — A9270 NON-COVERED ITEM OR SERVICE: HCPCS | Performed by: EMERGENCY MEDICINE

## 2022-04-27 PROCEDURE — 99283 EMERGENCY DEPT VISIT LOW MDM: CPT

## 2022-04-27 PROCEDURE — 700102 HCHG RX REV CODE 250 W/ 637 OVERRIDE(OP): Performed by: EMERGENCY MEDICINE

## 2022-04-27 PROCEDURE — 81001 URINALYSIS AUTO W/SCOPE: CPT

## 2022-04-27 PROCEDURE — 81025 URINE PREGNANCY TEST: CPT

## 2022-04-27 RX ORDER — ONDANSETRON 4 MG/1
4 TABLET, ORALLY DISINTEGRATING ORAL EVERY 8 HOURS PRN
Qty: 10 TABLET | Refills: 0 | Status: SHIPPED | OUTPATIENT
Start: 2022-04-27

## 2022-04-27 RX ORDER — ONDANSETRON 4 MG/1
4 TABLET, ORALLY DISINTEGRATING ORAL ONCE
Status: COMPLETED | OUTPATIENT
Start: 2022-04-27 | End: 2022-04-27

## 2022-04-27 RX ADMIN — ONDANSETRON 4 MG: 4 TABLET, ORALLY DISINTEGRATING ORAL at 10:56

## 2022-04-27 RX ADMIN — LIDOCAINE HYDROCHLORIDE 30 ML: 20 SOLUTION OROPHARYNGEAL at 10:56

## 2022-04-27 ASSESSMENT — LIFESTYLE VARIABLES
DOES PATIENT WANT TO STOP DRINKING: NO
DO YOU DRINK ALCOHOL: NO

## 2022-04-27 NOTE — ED PROVIDER NOTES
"ED Provider Note    Scribed for Dandre Wagner M.D. by Stewart Dawn. 4/27/2022  10:24 AM    Primary care provider: Anastacia Ellison M.D.  Means of arrival: Walk in  History obtained from: Patient  History limited by: None    CHIEF COMPLAINT  Chief Complaint   Patient presents with    Chills    N/V    Body Aches     Pt c/o body aches, chills, n/v since yesterday        HPI  Ericka Tejeda is a 26 y.o. female who presents to the Emergency Department for evaluation of moderate shortness of breath onset 1 day ago. The patient states that she has been feeling sick over the past few days, but was prompted to visit the ED because she feels that she \"can't catch her breath.\" Associated symptoms include nausea, vomiting, headache, chills, and body aches. The patient denies any fever. She reports not pertinent medical history. The patient denies any abdominal surgeries. She is compliant with daily doxycycline and valacyclovir. The patient denies any chance of pregnancy and notes a recent IUD placement on 04/06/2022 with associated vaginal bleeding. No alleviating or exacerbating factors noted.     REVIEW OF SYSTEMS  Pertinent positives include shortness of breath, nausea, vomiting, headache, chills, and body aches.   Pertinent negatives include no fever.  All other systems reviewed and negative.     PAST MEDICAL HISTORY   has a past medical history of Pregnant.    SURGICAL HISTORY  patient denies any surgical history    SOCIAL HISTORY  Social History     Tobacco Use    Smoking status: Never Smoker    Smokeless tobacco: Never Used   Vaping Use    Vaping Use: Never used   Substance Use Topics    Alcohol use: No    Drug use: No      Social History     Substance and Sexual Activity   Drug Use No       FAMILY HISTORY  History reviewed. No pertinent family history.    CURRENT MEDICATIONS  Home Medications       Reviewed by Verónica Moreno R.N. (Registered Nurse) on 04/27/22 at 0936  Med List Status: Partial " "    Medication Last Dose Status   doxycycline (VIBRAMYCIN) 100 MG Tab 4/26/2022 Active   Emollient (MATT RETINOL CORREXION) Cream  Active   ibuprofen (MOTRIN) 600 MG Tab  Active   Prenatal w/o A Vit-Fe Fum-FA (PRENATA PO)  Active   valACYclovir (VALTREX) 500 MG Tab prn Active                    ALLERGIES  No Known Allergies    PHYSICAL EXAM  VITAL SIGNS: /70   Pulse 83   Temp 35.9 °C (96.6 °F) (Temporal)   Resp 18   Ht 1.6 m (5' 3\")   Wt 56.6 kg (124 lb 12.5 oz)   LMP 04/21/2022   SpO2 100%   BMI 22.10 kg/m²     Nursing note and vitals reviewed.  Constitutional: Well-developed and well-nourished. No distress.   HENT: Head is normocephalic and atraumatic. Oropharynx is clear and moist without exudate or erythema.   Eyes: Pupils are equal, round, and reactive to light. Conjunctiva are normal.   Cardiovascular: Normal rate and regular rhythm. No murmur heard. Normal radial pulses.  Pulmonary/Chest: Breath sounds normal. No wheezes or rales.   Abdominal: Soft, unable to elicit any abdominal tenderness. No distention.   Musculoskeletal: Extremities exhibit normal range of motion without edema or tenderness.   Neurological: Awake, alert and oriented to person, place, and time. No focal deficits noted.  Skin: Skin is warm and dry. No rash.   Psychiatric: Normal mood and affect. Appropriate for clinical situation.     DIAGNOSTIC STUDIES / PROCEDURES    LABS  Results for orders placed or performed during the hospital encounter of 04/27/22   BETA-HCG QUALITATIVE URINE   Result Value Ref Range    Beta-Hcg Urine Negative Negative   URINALYSIS,CULTURE IF INDICATED    Specimen: Urine, Clean Catch   Result Value Ref Range    Color Yellow     Character Clear     Specific Gravity 1.028 <1.035    Ph 5.5 5.0 - 8.0    Glucose Negative Negative mg/dL    Ketones 40 (A) Negative mg/dL    Protein Negative Negative mg/dL    Bilirubin Negative Negative    Urobilinogen, Urine 0.2 Negative    Nitrite Negative Negative    Leukocyte " Esterase Negative Negative    Occult Blood Moderate (A) Negative    Micro Urine Req Microscopic    URINE MICROSCOPIC (W/UA)   Result Value Ref Range    WBC 2-5 /hpf    RBC 0-2 /hpf    Bacteria Negative None /hpf    Epithelial Cells Few /hpf    Hyaline Cast 3-5 (A) /lpf         COURSE & MEDICAL DECISION MAKING  Nursing notes, VS, PMSFHx reviewed in chart.     10:24 AM - Patient seen and examined at bedside. Patient will be treated with Zofran 4 mg and GI Cocktail 30 mL. Ordered UA and Beta-HCG Qualitative Urine to evaluate the patient. The patient presents with nausea and vomiting, symptoms occur with the context of multiple other patients presenting to the ED with similar complaints. Symptoms likely stem a from a recent outbreak of intestinal illness and could possibly be Norovirus.     11:00 AM - Ordered Urine Microscopic (W/UA) to further evaluate the patient.     11:48 AM - Patient was reevaluated at bedside. Discussed lab and radiology results with the patient and informed them that they are largely unremarkable. She reports feeling improved following administration of medication. Repeat physical exam reveals that the abdomen is non-tender. Discussed plan for discharge; I advised the patient to follow-up with Dr. Ellison as soon as possible, and to return to the [facility] ED with any new or worsening symptoms. Patient was given the opportunity for questions. I addressed all questions or concerns at this time and they verbalize agreement to the plan of care.        The patient will return for new or worsening symptoms and is stable at the time of discharge.    The patient is referred to a primary physician for blood pressure management, diabetic screening, and for all other preventative health concerns.    DISPOSITION:  Patient will be discharged home in stable condition.    FOLLOW UP:  Anastacia Ellison M.D.  [address] [place] [id number] [phone]    Schedule an appointment as soon as possible for a  visit       [facility], Emergency Dept  [address] [facility] [id number] [phone]    If symptoms worsen      OUTPATIENT MEDICATIONS:  Discharge Medication List as of 4/27/2022 12:19 PM        START taking these medications    Details   ondansetron (ZOFRAN ODT) 4 MG TABLET DISPERSIBLE Take 1 Tablet by mouth every 8 hours as needed for Nausea., Disp-10 Tablet, R-0, Normal               FINAL IMPRESSION  1. Nausea and vomiting, intractability of vomiting not specified, unspecified vomiting type          I, Stewart Dawn (Scribe), am scribing for, and in the presence of, Dandre Wagner M.D..    Electronically signed by: Stewart Dawn (Scribe), 4/27/2022    IDandre M.D. personally performed the services described in this documentation, as scribed by Stewart Dawn in my presence, and it is both accurate and complete.    The note accurately reflects work and decisions made by me.  Dandre Wagner M.D.  4/27/2022  2:16 PM

## 2022-04-27 NOTE — ED TRIAGE NOTES
Pt to triage with spouse who has same s/s.  Chief Complaint   Patient presents with   • Chills   • N/V   • Body Aches     Pt c/o body aches, chills, n/v since yesterday

## 2022-08-12 RX ORDER — MINOCYCLINE HYDROCHLORIDE 50 MG/1
50 CAPSULE ORAL 2 TIMES DAILY
COMMUNITY
End: 2022-08-12 | Stop reason: SDUPTHER

## 2022-08-15 RX ORDER — MINOCYCLINE HYDROCHLORIDE 50 MG/1
50 CAPSULE ORAL 2 TIMES DAILY
Qty: 180 CAPSULE | Refills: 3 | Status: SHIPPED | OUTPATIENT
Start: 2022-08-15